# Patient Record
Sex: FEMALE | Race: WHITE | NOT HISPANIC OR LATINO | Employment: FULL TIME | URBAN - METROPOLITAN AREA
[De-identification: names, ages, dates, MRNs, and addresses within clinical notes are randomized per-mention and may not be internally consistent; named-entity substitution may affect disease eponyms.]

---

## 2017-09-12 ENCOUNTER — TRANSCRIBE ORDERS (OUTPATIENT)
Dept: ADMINISTRATIVE | Facility: HOSPITAL | Age: 37
End: 2017-09-12

## 2017-09-12 ENCOUNTER — HOSPITAL ENCOUNTER (OUTPATIENT)
Dept: GENERAL RADIOLOGY | Facility: HOSPITAL | Age: 37
Discharge: HOME | End: 2017-09-12
Admitting: NURSE PRACTITIONER

## 2017-09-12 DIAGNOSIS — M67.912 DISORDER OF LEFT ROTATOR CUFF: Primary | ICD-10-CM

## 2017-09-12 DIAGNOSIS — M67.912 DISORDER OF LEFT ROTATOR CUFF: ICD-10-CM

## 2017-09-12 PROCEDURE — 73030 X-RAY EXAM OF SHOULDER: CPT

## 2018-07-25 ENCOUNTER — TRANSCRIBE ORDERS (OUTPATIENT)
Dept: DIABETES SERVICES | Facility: HOSPITAL | Age: 38
End: 2018-07-25

## 2018-07-25 DIAGNOSIS — O24.419 GESTATIONAL DIABETES MELLITUS (GDM), ANTEPARTUM, GESTATIONAL DIABETES METHOD OF CONTROL UNSPECIFIED: Primary | ICD-10-CM

## 2018-07-26 ENCOUNTER — HOSPITAL ENCOUNTER (OUTPATIENT)
Dept: DIABETES SERVICES | Facility: HOSPITAL | Age: 38
Setting detail: RECURRING SERIES
Discharge: HOME | End: 2018-07-26

## 2018-07-26 PROCEDURE — G0109 DIAB MANAGE TRN IND/GROUP: HCPCS | Performed by: DIETITIAN, REGISTERED

## 2018-07-26 PROCEDURE — G0108 DIAB MANAGE TRN  PER INDIV: HCPCS | Performed by: DIETITIAN, REGISTERED

## 2018-08-07 ENCOUNTER — HOSPITAL ENCOUNTER (INPATIENT)
Facility: HOSPITAL | Age: 38
LOS: 5 days | Discharge: HOME | End: 2018-08-13
Attending: OBSTETRICS & GYNECOLOGY | Admitting: OBSTETRICS & GYNECOLOGY

## 2018-08-07 LAB
BACTERIA UR QL AUTO: NORMAL /HPF
BILIRUB UR QL STRIP: NEGATIVE
CLARITY UR: CLEAR
COLOR UR: YELLOW
DEPRECATED RDW RBC AUTO: 43.2 FL (ref 37–54)
ERYTHROCYTE [DISTWIDTH] IN BLOOD BY AUTOMATED COUNT: 12.9 % (ref 11.3–14.5)
GLUCOSE BLDC GLUCOMTR-MCNC: 85 MG/DL (ref 70–130)
GLUCOSE UR STRIP-MCNC: NEGATIVE MG/DL
HCT VFR BLD AUTO: 34.1 % (ref 34.5–44)
HGB BLD-MCNC: 11.3 G/DL (ref 11.5–15.5)
HGB UR QL STRIP.AUTO: ABNORMAL
HYALINE CASTS UR QL AUTO: NORMAL /LPF
KETONES UR QL STRIP: NEGATIVE
LEUKOCYTE ESTERASE UR QL STRIP.AUTO: NEGATIVE
MCH RBC QN AUTO: 30.5 PG (ref 27–31)
MCHC RBC AUTO-ENTMCNC: 33.1 G/DL (ref 32–36)
MCV RBC AUTO: 92.2 FL (ref 80–99)
NITRITE UR QL STRIP: NEGATIVE
PH UR STRIP.AUTO: 5.5 [PH] (ref 5–8)
PLATELET # BLD AUTO: 202 10*3/MM3 (ref 150–450)
PMV BLD AUTO: 10 FL (ref 6–12)
PROT UR QL STRIP: NEGATIVE
RBC # BLD AUTO: 3.7 10*6/MM3 (ref 3.89–5.14)
RBC # UR: NORMAL /HPF
REF LAB TEST METHOD: NORMAL
SP GR UR STRIP: 1.01 (ref 1–1.03)
SQUAMOUS #/AREA URNS HPF: NORMAL /HPF
UROBILINOGEN UR QL STRIP: ABNORMAL
WBC NRBC COR # BLD: 11.91 10*3/MM3 (ref 3.5–10.8)
WBC UR QL AUTO: NORMAL /HPF

## 2018-08-07 PROCEDURE — 86901 BLOOD TYPING SEROLOGIC RH(D): CPT | Performed by: OBSTETRICS & GYNECOLOGY

## 2018-08-07 PROCEDURE — 86850 RBC ANTIBODY SCREEN: CPT | Performed by: OBSTETRICS & GYNECOLOGY

## 2018-08-07 PROCEDURE — 86900 BLOOD TYPING SEROLOGIC ABO: CPT | Performed by: OBSTETRICS & GYNECOLOGY

## 2018-08-07 PROCEDURE — 85027 COMPLETE CBC AUTOMATED: CPT | Performed by: OBSTETRICS & GYNECOLOGY

## 2018-08-07 PROCEDURE — 82962 GLUCOSE BLOOD TEST: CPT

## 2018-08-07 PROCEDURE — 81001 URINALYSIS AUTO W/SCOPE: CPT | Performed by: OBSTETRICS & GYNECOLOGY

## 2018-08-08 ENCOUNTER — APPOINTMENT (OUTPATIENT)
Dept: WOMENS IMAGING | Facility: HOSPITAL | Age: 38
End: 2018-08-08

## 2018-08-08 PROBLEM — R58 BLEEDING: Status: ACTIVE | Noted: 2018-08-08

## 2018-08-08 PROBLEM — Z98.891 PREVIOUS CESAREAN SECTION: Status: ACTIVE | Noted: 2018-08-08

## 2018-08-08 PROBLEM — O44.03 PLACENTA PREVIA IN THIRD TRIMESTER: Status: ACTIVE | Noted: 2018-08-08

## 2018-08-08 LAB
ABO GROUP BLD: NORMAL
BLD GP AB SCN SERPL QL: NEGATIVE
DEPRECATED RDW RBC AUTO: 43.6 FL (ref 37–54)
ERYTHROCYTE [DISTWIDTH] IN BLOOD BY AUTOMATED COUNT: 13 % (ref 11.3–14.5)
GLUCOSE BLDC GLUCOMTR-MCNC: 190 MG/DL (ref 70–130)
GLUCOSE BLDC GLUCOMTR-MCNC: 95 MG/DL (ref 70–130)
HCT VFR BLD AUTO: 35.6 % (ref 34.5–44)
HGB BLD-MCNC: 11.6 G/DL (ref 11.5–15.5)
MCH RBC QN AUTO: 30.1 PG (ref 27–31)
MCHC RBC AUTO-ENTMCNC: 32.6 G/DL (ref 32–36)
MCV RBC AUTO: 92.5 FL (ref 80–99)
PLATELET # BLD AUTO: 224 10*3/MM3 (ref 150–450)
PMV BLD AUTO: 9.9 FL (ref 6–12)
RBC # BLD AUTO: 3.85 10*6/MM3 (ref 3.89–5.14)
RH BLD: POSITIVE
T&S EXPIRATION DATE: NORMAL
WBC NRBC COR # BLD: 8.98 10*3/MM3 (ref 3.5–10.8)

## 2018-08-08 PROCEDURE — 25010000002 BETAMETHASONE ACET & SOD PHOS PER 4 MG: Performed by: OBSTETRICS & GYNECOLOGY

## 2018-08-08 PROCEDURE — 85027 COMPLETE CBC AUTOMATED: CPT | Performed by: OBSTETRICS & GYNECOLOGY

## 2018-08-08 PROCEDURE — 76819 FETAL BIOPHYS PROFIL W/O NST: CPT

## 2018-08-08 PROCEDURE — 82962 GLUCOSE BLOOD TEST: CPT

## 2018-08-08 PROCEDURE — 76819 FETAL BIOPHYS PROFIL W/O NST: CPT | Performed by: OBSTETRICS & GYNECOLOGY

## 2018-08-08 PROCEDURE — 25010000002 MAGNESIUM SULFATE-LACT RINGERS 40 GM/580ML SOLUTION: Performed by: OBSTETRICS & GYNECOLOGY

## 2018-08-08 PROCEDURE — 76811 OB US DETAILED SNGL FETUS: CPT | Performed by: OBSTETRICS & GYNECOLOGY

## 2018-08-08 PROCEDURE — 76811 OB US DETAILED SNGL FETUS: CPT

## 2018-08-08 RX ORDER — AMOXICILLIN 250 MG/1
500 CAPSULE ORAL EVERY 8 HOURS
Status: DISCONTINUED | OUTPATIENT
Start: 2018-08-10 | End: 2018-08-10

## 2018-08-08 RX ORDER — MAGNESIUM SULF/RINGERS LACTATE 40 G/500ML
2 PLASTIC BAG, INJECTION (ML) INTRAVENOUS CONTINUOUS
Status: DISCONTINUED | OUTPATIENT
Start: 2018-08-08 | End: 2018-08-08

## 2018-08-08 RX ORDER — AZITHROMYCIN 250 MG/1
1000 TABLET, FILM COATED ORAL ONCE
Status: COMPLETED | OUTPATIENT
Start: 2018-08-08 | End: 2018-08-08

## 2018-08-08 RX ORDER — DEXTROSE AND SODIUM CHLORIDE 5; .2 G/100ML; G/100ML
96 INJECTION, SOLUTION INTRAVENOUS CONTINUOUS
Status: DISCONTINUED | OUTPATIENT
Start: 2018-08-08 | End: 2018-08-13 | Stop reason: HOSPADM

## 2018-08-08 RX ORDER — MAGNESIUM SULF/RINGERS LACTATE 40 G/500ML
2 PLASTIC BAG, INJECTION (ML) INTRAVENOUS CONTINUOUS
Status: DISCONTINUED | OUTPATIENT
Start: 2018-08-08 | End: 2018-08-11

## 2018-08-08 RX ORDER — SODIUM CHLORIDE 0.9 % (FLUSH) 0.9 %
1-10 SYRINGE (ML) INJECTION AS NEEDED
Status: DISCONTINUED | OUTPATIENT
Start: 2018-08-08 | End: 2018-08-13 | Stop reason: HOSPADM

## 2018-08-08 RX ORDER — BETAMETHASONE SODIUM PHOSPHATE AND BETAMETHASONE ACETATE 3; 3 MG/ML; MG/ML
12 INJECTION, SUSPENSION INTRA-ARTICULAR; INTRALESIONAL; INTRAMUSCULAR; SOFT TISSUE ONCE
Status: COMPLETED | OUTPATIENT
Start: 2018-08-08 | End: 2018-08-08

## 2018-08-08 RX ORDER — LIDOCAINE HYDROCHLORIDE 10 MG/ML
5 INJECTION, SOLUTION EPIDURAL; INFILTRATION; INTRACAUDAL; PERINEURAL AS NEEDED
Status: DISCONTINUED | OUTPATIENT
Start: 2018-08-08 | End: 2018-08-13 | Stop reason: HOSPADM

## 2018-08-08 RX ADMIN — AMPICILLIN SODIUM 2 G: 2 INJECTION, POWDER, FOR SOLUTION INTRAMUSCULAR; INTRAVENOUS at 11:27

## 2018-08-08 RX ADMIN — DEXTROSE AND SODIUM CHLORIDE 96 ML: 5; 200 INJECTION, SOLUTION INTRAVENOUS at 19:35

## 2018-08-08 RX ADMIN — AMPICILLIN SODIUM 2 G: 2 INJECTION, POWDER, FOR SOLUTION INTRAMUSCULAR; INTRAVENOUS at 17:52

## 2018-08-08 RX ADMIN — MAGNESIUM SULFATE HEPTAHYDRATE 2 G/HR: 500 INJECTION, SOLUTION INTRAMUSCULAR; INTRAVENOUS at 09:55

## 2018-08-08 RX ADMIN — AMPICILLIN SODIUM 2 G: 2 INJECTION, POWDER, FOR SOLUTION INTRAMUSCULAR; INTRAVENOUS at 23:14

## 2018-08-08 RX ADMIN — AZITHROMYCIN 1000 MG: 250 TABLET, FILM COATED ORAL at 10:39

## 2018-08-08 RX ADMIN — DEXTROSE AND SODIUM CHLORIDE 1000 ML: 5; 200 INJECTION, SOLUTION INTRAVENOUS at 10:31

## 2018-08-08 RX ADMIN — BETAMETHASONE SODIUM PHOSPHATE AND BETAMETHASONE ACETATE 12 MG: 3; 3 INJECTION, SUSPENSION INTRA-ARTICULAR; INTRALESIONAL; INTRAMUSCULAR at 09:54

## 2018-08-08 NOTE — PLAN OF CARE
Problem: Patient Care Overview  Goal: Plan of Care Review  Outcome: Ongoing (interventions implemented as appropriate)   08/08/18 0837   Coping/Psychosocial   Plan of Care Reviewed With patient   Plan of Care Review   Progress improving

## 2018-08-08 NOTE — PROGRESS NOTES
2018  HD:0  37 y.o. yo female  at 31w3d    Subjective   Hali denies new BRB or LOF.   Good FM.  PDC scan reveals placenta previa and oligohydramnios.  Reports good BS at home.       Objective   Temp: Temp:  [98.2 °F (36.8 °C)-99 °F (37.2 °C)] 98.2 °F (36.8 °C) Temp src: Oral   BP: BP: (112-127)/(67-70) 112/70        Pulse: Heart Rate:  [71-82] 71  RR: Resp:  [16-18] 16    General:  nad   Abdomen: Gravid, nontender   NST  Cat 1     Lab Results   Component Value Date    WBC 8.98 2018    HGB 11.6 2018    HCT 35.6 2018    MCV 92.5 2018     2018    HEPBSAG Negative 04/10/2016                     Assessment  1.   37 y.o. yo female  at 31w3d who presented with vaginal bleeding.  2.  Placenta previa and low fluid.    Plan  1.  Begin magnesium, steroids for FLM and antibiotics   2.  Repeat scan to evaluate fluid in 2-3 days.  3  Fetal growth and heart rate tracing reassuring.    This note has been electronically signed.    Petra Hernández MD  2018

## 2018-08-08 NOTE — H&P
[facility]  Obstetric History and Physical    Chief Complaint   Patient presents with   • Vaginal Bleeding   • Abdominal Pain       Subjective     Patient is a 37 y.o. female  currently at 31w2d, who presents with an episode of dark blood tinged mucous vaginal discharge.  She said she had the single episode that saturated a undergarment pad.  She got up and used the bathroom and again had some on the toilet paper, but far less.  She has not had any further discharge.   She denies recent intercourse or trauma.   She denies contractions.  She has some mild lower abdominal pressure, but this has been this way.  She has a history of a C/S due to breech 2 years ago.  She plans on another C/S.  She had a normal anatomy scan at 20 weeks.   +        The following portions of the patients history were reviewed and updated as appropriate: current medications, allergies, past medical history, past surgical history, past family history, past social history and problem list .       Prenatal Information:   Maternal Prenatal Labs  Blood Type No results found for: ABO   Rh Status No results found for: RH   Antibody Screen No results found for: ABSCRN   Gonnorhea No results found for: GCCX   Chlamydia No results found for: CLAMYDCU   RPR No results found for: RPR   Syphilis Antibody No results found for: SYPHILIS   Rubella No results found for: RUBELLAIGGIN   Hepatitis B Surface Antigen No results found for: HEPBSAG   HIV-1 Antibody No results found for: LABHIV1   Hepatitis C Antibody No results found for: HEPCAB   Rapid Urin Drug Screen No results found for: AMPMETHU, BARBITSCNUR, LABBENZSCN, LABMETHSCN, LABOPIASCN, THCURSCR, COCAINEUR, AMPHETSCREEN, PROPOXSCN, BUPRENORSCNU, METAMPSCNUR, OXYCODONESCN, TRICYCLICSCN   Group B Strep Culture No results found for: GBSANTIGEN           External Prenatal Results     Pregnancy Outside Results - Transcribed From Office Records - See Scanned Records For Details     Test Value Date  Time    Hgb 9.1 g/dL (L) 16 0708    Hct 27.6 % (L) 16 0708    ABO B  16 0925    Rh Positive  16 0925    Antibody Screen Negative  16 0925    Glucose Fasting GTT       Glucose Tolerance Test 1 hour 140  16     Glucose Tolerance Test 3 hour       Gonorrhea (discrete) Negative  16     Chlamydia (discrete) Negative  16     RPR Non-Reactive  04/10/16     VDRL       Syphilis Antibody       Rubella Immune  04/10/16     HBsAg Negative  04/10/16     Herpes Simplex Virus PCR       Herpes Simplex VIrus Culture       HIV       Hep C RNA Quant PCR       Hep C Antibody       AFP       Group B Strep Negative  16     GBS Susceptibility to Clindamycin       GBS Susceptibility to Erythromycin       Fetal Fibronectin       Genetic Testing, Maternal Blood             Drug Screening     Test Value Date Time    Urine Drug Screen Negative  16     Amphetamine Screen       Barbiturate Screen       Benzodiazepine Screen       Methadone Screen       Phencyclidine Screen       Opiates Screen       THC Screen       Cocaine Screen       Propoxyphene Screen       Buprenorphine Screen       Methamphetamine Screen       Oxycodone Screen       Tricyclic Antidepressants Screen                     Past OB History:     Obstetric History       T1      L1     SAB0   TAB0   Ectopic0   Molar0   Multiple0   Live Births1       # Outcome Date GA Lbr Ryland/2nd Weight Sex Delivery Anes PTL Lv   3 Current            2 Term 16 38w5d  3348 g (7 lb 6.1 oz) M CS-LTranv Spinal N MARY      Name: YULIAPROSPER      Apgar1:  8                Apgar5: 9   1 AB  5w0d                 Past Medical History: Past Medical History:   Diagnosis Date   • Anemia    • Gestational diabetes    • Wears glasses       Past Surgical History Past Surgical History:   Procedure Laterality Date   • INDUCED       EAB - CONFIDENTIAL   • TIBIA FRACTURE SURGERY Right 2000    rods in place   • WISDOM  TOOTH EXTRACTION        Family History: Family History   Problem Relation Age of Onset   • Cancer Father    • Liver cancer Father    • Heart attack Sister    • Heart attack Paternal Grandfather    • Cancer Paternal Grandmother    • Cancer Maternal Grandmother    • Diabetes Maternal Grandfather    • Cancer Maternal Grandfather       Social History:  reports that she quit smoking about 2 years ago. Her smoking use included Cigarettes. She has never used smokeless tobacco.   reports that she does not drink alcohol.   reports that she does not use drugs.        Review of Systems  Denies fever, HA, CP, Shortness of air, muscle weakness, and rashes      Objective     Vital Signs Range for the last 24 hours  Temperature: Temp:  [99 °F (37.2 °C)] 99 °F (37.2 °C)   Temp Source: Temp src: Oral   BP: BP: (120)/(68) 120/68   Pulse: Heart Rate:  [82] 82   Respirations: Resp:  [16] 16   SPO2:     O2 Amount (l/min):     O2 Devices     Weight: Weight:  [74.4 kg (164 lb)] 74.4 kg (164 lb)     Physical Examination: General appearance - alert, comfortable with no acute distress.    Chest - clear to auscultation, no wheezes, rales or rhonchi, symmetric air entry  Heart - S1 and S2 normal, no murmurs noted  Abdomen - soft, nontender, nondistended, no masses or organomegaly  no rebound tenderness noted  bowel sounds normal  No guarding, No RUQ pain  Extremities - pedal edema 1+    Presentation: Cephalic by USN   Cervix: Exam by: Method: sterile exam per physician   Dilation:  closed   Effacement: Cervical Effacement: 0%   Station:  high     Fetal Heart Rate Assessment   Method:     Beats/min:     Baseline:  130s   Varibility:  mod   Accels:  y   Decels:  n   Tracing Category:  1     Uterine Assessment   Method:     Frequency (min):  None   Ctx Count in 10 min:     Duration:     Intensity:     Intensity by IUPC:     Resting Tone:     Resting Tone by IUPC:           USN:    BPP 8/8   CR 11    Ferning:   Negative, pooling  negative          Assessment:  1. Intrauterine pregnancy at 31w2d weeks gestation with reassuring fetal status  2.  Third trimester spotting    Plan:  1. Admit for observation  2. PDC scan in the am.   3. Continuous monitoring on APU  4.  Discussed with Dr. Betty Leigh MD  8/7/2018  10:45 PM

## 2018-08-08 NOTE — PLAN OF CARE
Problem:  Labor (Adult,Obstetrics,Pediatric)  Goal: Signs and Symptoms of Listed Potential Problems Will be Absent, Minimized or Managed ( Labor)  Outcome: Ongoing (interventions implemented as appropriate)   18 0838   Goal/Outcome Evaluation   Problems Assessed ( Labor) all   Problems Present ( Labor) none

## 2018-08-09 LAB
GLUCOSE BLDC GLUCOMTR-MCNC: 119 MG/DL (ref 70–130)
GLUCOSE BLDC GLUCOMTR-MCNC: 153 MG/DL (ref 70–130)
GLUCOSE BLDC GLUCOMTR-MCNC: 162 MG/DL (ref 70–130)
GLUCOSE BLDC GLUCOMTR-MCNC: 169 MG/DL (ref 70–130)

## 2018-08-09 PROCEDURE — 25010000002 MAGNESIUM SULFATE-LACT RINGERS 40 GM/580ML SOLUTION: Performed by: OBSTETRICS & GYNECOLOGY

## 2018-08-09 PROCEDURE — 59025 FETAL NON-STRESS TEST: CPT

## 2018-08-09 PROCEDURE — 82962 GLUCOSE BLOOD TEST: CPT

## 2018-08-09 PROCEDURE — 25010000002 BETAMETHASONE ACET & SOD PHOS PER 4 MG: Performed by: OBSTETRICS & GYNECOLOGY

## 2018-08-09 RX ORDER — BETAMETHASONE SODIUM PHOSPHATE AND BETAMETHASONE ACETATE 3; 3 MG/ML; MG/ML
12 INJECTION, SUSPENSION INTRA-ARTICULAR; INTRALESIONAL; INTRAMUSCULAR; SOFT TISSUE ONCE
Status: COMPLETED | OUTPATIENT
Start: 2018-08-09 | End: 2018-08-09

## 2018-08-09 RX ORDER — BETAMETHASONE SODIUM PHOSPHATE AND BETAMETHASONE ACETATE 3; 3 MG/ML; MG/ML
12 INJECTION, SUSPENSION INTRA-ARTICULAR; INTRALESIONAL; INTRAMUSCULAR; SOFT TISSUE EVERY 24 HOURS
Status: DISCONTINUED | OUTPATIENT
Start: 2018-08-09 | End: 2018-08-09

## 2018-08-09 RX ORDER — ACETAMINOPHEN 500 MG
1000 TABLET ORAL EVERY 6 HOURS PRN
Status: DISCONTINUED | OUTPATIENT
Start: 2018-08-09 | End: 2018-08-13 | Stop reason: HOSPADM

## 2018-08-09 RX ADMIN — AMPICILLIN SODIUM 2 G: 2 INJECTION, POWDER, FOR SOLUTION INTRAMUSCULAR; INTRAVENOUS at 10:24

## 2018-08-09 RX ADMIN — MAGNESIUM SULFATE HEPTAHYDRATE 2 G/HR: 500 INJECTION, SOLUTION INTRAMUSCULAR; INTRAVENOUS at 01:15

## 2018-08-09 RX ADMIN — BETAMETHASONE SODIUM PHOSPHATE AND BETAMETHASONE ACETATE 12 MG: 3; 3 INJECTION, SUSPENSION INTRA-ARTICULAR; INTRALESIONAL; INTRAMUSCULAR at 10:15

## 2018-08-09 RX ADMIN — AMPICILLIN SODIUM 2 G: 2 INJECTION, POWDER, FOR SOLUTION INTRAMUSCULAR; INTRAVENOUS at 16:36

## 2018-08-09 RX ADMIN — ACETAMINOPHEN 1000 MG: 500 TABLET, FILM COATED ORAL at 02:51

## 2018-08-09 RX ADMIN — DEXTROSE AND SODIUM CHLORIDE 96 ML: 5; 200 INJECTION, SOLUTION INTRAVENOUS at 05:23

## 2018-08-09 RX ADMIN — AMPICILLIN SODIUM 2 G: 2 INJECTION, POWDER, FOR SOLUTION INTRAMUSCULAR; INTRAVENOUS at 22:57

## 2018-08-09 RX ADMIN — DEXTROSE AND SODIUM CHLORIDE 96 ML: 5; 200 INJECTION, SOLUTION INTRAVENOUS at 16:22

## 2018-08-09 RX ADMIN — MAGNESIUM SULFATE HEPTAHYDRATE 2 G/HR: 500 INJECTION, SOLUTION INTRAMUSCULAR; INTRAVENOUS at 22:59

## 2018-08-09 RX ADMIN — AMPICILLIN SODIUM 2 G: 2 INJECTION, POWDER, FOR SOLUTION INTRAMUSCULAR; INTRAVENOUS at 05:22

## 2018-08-09 NOTE — PROGRESS NOTES
"Adult Nutrition  Assessment/PES    Patient Name:  Hali Dennis  YOB: 1980  MRN: [id number]  Admit Date:  8/7/2018    Assessment Date:  8/9/2018    Comments:            Reason for Assessment     Row Name 08/09/18 1301          Reason for Assessment    Reason For Assessment identified at risk by screening criteria     Diagnosis --   admitted at 31w2d with placenta previs, oligohydramnios and GDM             Nutrition/Diet History     Row Name 08/09/18 1302          Nutrition/Diet History    Typical Food/Fluid Intake Spoke w/patient, she stated that her appetite is good and she does not have any c/o nausea today.  She stated her pre-pregnancy weight was 135 pounds.  Patient has GDM and stated that she is diet controlled and her blood sugars have been within normal limits but since receiving steroids they have been elevated.  She stated that she sticks to 30 grams of carbohydrates at breastfast and 45-55 at lunch and dinner.  Food has been good and within the number of carbohydrates she takes.             Anthropometrics     Row Name 08/09/18 1306          Anthropometrics    Height Method measured     Height 160 cm (62.99\")     Current Weight Method measured     Weight 74.4 kg (164 lb 0.4 oz)        Ideal Body Weight (IBW)    Ideal Body Weight (IBW) (kg) 52.7     % Ideal Body Weight 141.18        Usual Body Weight (UBW)    Usual Body Weight 61.2 kg (135 lb)   pre-pregnancy weight     % Usual Body Weight 121.5     Weight Loss intentional        Body Mass Index (BMI)    BMI (kg/m2) 29.12          Labs/Tests/Procedures/Meds     Row Name 08/09/18 1308          Labs/Procedures/Meds    Lab Results Reviewed reviewed               Estimated/Assessed Needs     Row Name 08/09/18 1306          Calculation Measurements    Height 160 cm (62.99\")             Nutrition Prescription Ordered     Row Name 08/09/18 1308          Nutrition Prescription PO    Current PO Diet Regular     Common Modifiers Consistent " "Carbohydrate             Evaluation of Received Nutrient/Fluid Intake     Row Name 08/09/18 1308 08/09/18 1306       Calculation Measurements    Height  -- 160 cm (62.99\")       PO Evaluation    Number of Days PO Intake Evaluated Insufficient Data  --            Evaluation of Prescribed Nutrient/Fluid Intake     Row Name 08/09/18 1306          Calculation Measurements    Height 160 cm (62.99\")           Problem/Interventions:        Problem 1     Row Name 08/09/18 1308          Nutrition Diagnoses Problem 1    Problem 1 Impaired Nutrient Utilization     Etiology (related to) Medical Diagnosis     Endocrine GDM     Signs/Symptoms (evidenced by) Biochemical     Labs Reviewed Done     Specific Labs Noted Glucose                     Intervention Goal     Row Name 08/09/18 1309          Intervention Goal    General Nutrition support treatment     Weight Appropriate weight gain             Nutrition Intervention     Row Name 08/09/18 1309          Nutrition Intervention    RD/Tech Action Interview for preference;Encourage intake;Follow Tx progress;Care plan reviewd               Education/Evaluation     Row Name 08/09/18 1309          Monitor/Evaluation    Monitor Per protocol;PO intake;Pertinent labs;Weight         Electronically signed by:  Darling Coe RD  08/09/18 1:11 PM   Time Spent:  30 minutes  "

## 2018-08-09 NOTE — PLAN OF CARE
Problem: Patient Care Overview  Goal: Plan of Care Review  Outcome: Ongoing (interventions implemented as appropriate)   18 0626   Coping/Psychosocial   Plan of Care Reviewed With patient   Plan of Care Review   Progress no change       Problem:  Labor (Adult,Obstetrics,Pediatric)  Goal: Signs and Symptoms of Listed Potential Problems Will be Absent, Minimized or Managed ( Labor)  Outcome: Ongoing (interventions implemented as appropriate)   18 0626   Goal/Outcome Evaluation   Problems Assessed ( Labor) all   Problems Present ( Labor) none

## 2018-08-09 NOTE — PLAN OF CARE
Problem: Patient Care Overview  Goal: Plan of Care Review  Outcome: Ongoing (interventions implemented as appropriate)   18 1500   Coping/Psychosocial   Plan of Care Reviewed With patient   Plan of Care Review   Progress no change       Problem:  Labor (Adult,Obstetrics,Pediatric)  Intervention: Prevent/Manage Maternal Infection   18 1500   Hygiene Care   Perineal Care absorbent pad changed   Safety Interventions   Infection Management aseptic technique maintained       Goal: Signs and Symptoms of Listed Potential Problems Will be Absent, Minimized or Managed ( Labor)  Outcome: Ongoing (interventions implemented as appropriate)   18 1500   Goal/Outcome Evaluation   Problems Assessed ( Labor) all   Problems Present ( Labor) none

## 2018-08-09 NOTE — PROGRESS NOTES
2018  HD:1  37 y.o. yo female  at 31w4d    Subjective   Hali denies new bleeding or LOF.   Tolerating Magnesium       Objective   Temp: Temp:  [98 °F (36.7 °C)-98.6 °F (37 °C)] 98 °F (36.7 °C) Temp src: Oral   BP: BP: ()/(50-77) 126/73        Pulse: Heart Rate:  [69-93] 84  RR: Resp:  [14-18] 14    General:  nad   Abdomen: Gravid, nontender   NST Rx     Lab Results   Component Value Date    WBC 8.98 2018    HGB 11.6 2018    HCT 35.6 2018    MCV 92.5 2018     2018    HEPBSAG Negative 04/10/2016                     Assessment  1.   37 y.o. yo female  at 31w4d  2.   Placenta previa with vaginal bleeding on presentation  3.  Oligohydramnios    Plan  1. Cont current hospital care.   2.   Repeat PDC scan in am.    3.  Continue magnesium, steroids and antibiotics.      This note has been electronically signed.    Petra Hernández MD  2018

## 2018-08-10 ENCOUNTER — APPOINTMENT (OUTPATIENT)
Dept: WOMENS IMAGING | Facility: HOSPITAL | Age: 38
End: 2018-08-10

## 2018-08-10 LAB
GLUCOSE BLDC GLUCOMTR-MCNC: 111 MG/DL (ref 70–130)
GLUCOSE BLDC GLUCOMTR-MCNC: 114 MG/DL (ref 70–130)
GLUCOSE BLDC GLUCOMTR-MCNC: 127 MG/DL (ref 70–130)
GLUCOSE BLDC GLUCOMTR-MCNC: 130 MG/DL (ref 70–130)

## 2018-08-10 PROCEDURE — 59025 FETAL NON-STRESS TEST: CPT

## 2018-08-10 PROCEDURE — 25010000002 MAGNESIUM SULFATE-LACT RINGERS 40 GM/580ML SOLUTION: Performed by: OBSTETRICS & GYNECOLOGY

## 2018-08-10 PROCEDURE — 76819 FETAL BIOPHYS PROFIL W/O NST: CPT

## 2018-08-10 PROCEDURE — 76817 TRANSVAGINAL US OBSTETRIC: CPT | Performed by: OBSTETRICS & GYNECOLOGY

## 2018-08-10 PROCEDURE — 76817 TRANSVAGINAL US OBSTETRIC: CPT

## 2018-08-10 PROCEDURE — 82962 GLUCOSE BLOOD TEST: CPT

## 2018-08-10 RX ORDER — DOCUSATE SODIUM 100 MG/1
100 CAPSULE, LIQUID FILLED ORAL 2 TIMES DAILY
Status: DISCONTINUED | OUTPATIENT
Start: 2018-08-10 | End: 2018-08-13 | Stop reason: HOSPADM

## 2018-08-10 RX ADMIN — MAGNESIUM SULFATE HEPTAHYDRATE 2 G/HR: 500 INJECTION, SOLUTION INTRAMUSCULAR; INTRAVENOUS at 20:53

## 2018-08-10 RX ADMIN — AMPICILLIN SODIUM 2 G: 2 INJECTION, POWDER, FOR SOLUTION INTRAMUSCULAR; INTRAVENOUS at 05:57

## 2018-08-10 RX ADMIN — ACETAMINOPHEN 1000 MG: 500 TABLET, FILM COATED ORAL at 01:47

## 2018-08-10 RX ADMIN — DOCUSATE SODIUM 100 MG: 100 CAPSULE, LIQUID FILLED ORAL at 12:43

## 2018-08-11 LAB
GLUCOSE BLDC GLUCOMTR-MCNC: 106 MG/DL (ref 70–130)
GLUCOSE BLDC GLUCOMTR-MCNC: 91 MG/DL (ref 70–130)
GLUCOSE BLDC GLUCOMTR-MCNC: 93 MG/DL (ref 70–130)

## 2018-08-11 PROCEDURE — 82962 GLUCOSE BLOOD TEST: CPT

## 2018-08-11 PROCEDURE — 59025 FETAL NON-STRESS TEST: CPT

## 2018-08-11 RX ORDER — FAMOTIDINE 20 MG/1
20 TABLET, FILM COATED ORAL 2 TIMES DAILY
Status: DISCONTINUED | OUTPATIENT
Start: 2018-08-11 | End: 2018-08-13 | Stop reason: HOSPADM

## 2018-08-11 RX ORDER — PRENATAL VIT/IRON FUM/FOLIC AC 27MG-0.8MG
1 TABLET ORAL DAILY
Status: DISCONTINUED | OUTPATIENT
Start: 2018-08-11 | End: 2018-08-13 | Stop reason: HOSPADM

## 2018-08-11 RX ORDER — SODIUM CHLORIDE, SODIUM LACTATE, POTASSIUM CHLORIDE, CALCIUM CHLORIDE 600; 310; 30; 20 MG/100ML; MG/100ML; MG/100ML; MG/100ML
125 INJECTION, SOLUTION INTRAVENOUS CONTINUOUS
Status: DISCONTINUED | OUTPATIENT
Start: 2018-08-11 | End: 2018-08-13 | Stop reason: HOSPADM

## 2018-08-11 RX ORDER — FAMOTIDINE 20 MG/1
20 TABLET, FILM COATED ORAL 2 TIMES DAILY
Status: DISCONTINUED | OUTPATIENT
Start: 2018-08-11 | End: 2018-08-11

## 2018-08-11 RX ORDER — TRISODIUM CITRATE DIHYDRATE AND CITRIC ACID MONOHYDRATE 500; 334 MG/5ML; MG/5ML
30 SOLUTION ORAL ONCE
Status: COMPLETED | OUTPATIENT
Start: 2018-08-11 | End: 2018-08-11

## 2018-08-11 RX ORDER — NIFEDIPINE 10 MG/1
10 CAPSULE ORAL
Status: DISCONTINUED | OUTPATIENT
Start: 2018-08-11 | End: 2018-08-13 | Stop reason: HOSPADM

## 2018-08-11 RX ADMIN — DEXTROSE AND SODIUM CHLORIDE 1000 ML: 5; 200 INJECTION, SOLUTION INTRAVENOUS at 00:17

## 2018-08-11 RX ADMIN — DOCUSATE SODIUM 100 MG: 100 CAPSULE, LIQUID FILLED ORAL at 08:55

## 2018-08-11 RX ADMIN — FAMOTIDINE 20 MG: 20 TABLET ORAL at 18:43

## 2018-08-11 RX ADMIN — NIFEDIPINE 10 MG: 10 CAPSULE, LIQUID FILLED ORAL at 20:06

## 2018-08-11 RX ADMIN — SODIUM CHLORIDE, POTASSIUM CHLORIDE, SODIUM LACTATE AND CALCIUM CHLORIDE 125 ML/HR: 600; 310; 30; 20 INJECTION, SOLUTION INTRAVENOUS at 10:17

## 2018-08-11 RX ADMIN — NIFEDIPINE 10 MG: 10 CAPSULE, LIQUID FILLED ORAL at 17:22

## 2018-08-11 RX ADMIN — DOCUSATE SODIUM 100 MG: 100 CAPSULE, LIQUID FILLED ORAL at 20:06

## 2018-08-11 RX ADMIN — SODIUM CITRATE AND CITRIC ACID MONOHYDRATE 30 ML: 500; 334 SOLUTION ORAL at 17:54

## 2018-08-11 RX ADMIN — NIFEDIPINE 10 MG: 10 CAPSULE, LIQUID FILLED ORAL at 13:50

## 2018-08-11 NOTE — PROGRESS NOTES
8/10/2018  HD:2  37 y.o. yo female  at 31w5d    Subjective   Hali denies new bleeding or LOF.         Objective   Temp: Temp:  [98 °F (36.7 °C)-99 °F (37.2 °C)] 99 °F (37.2 °C) Temp src: Oral   BP: BP: ()/(51-76) 108/51        Pulse: Heart Rate:  [62-80] 67  RR: Resp:  [14-18] 16    General:  nad   Abdomen: Gravid, nontender         Lab Results   Component Value Date    WBC 8.98 2018    HGB 11.6 2018    HCT 35.6 2018    MCV 92.5 2018     2018    HEPBSAG Negative 04/10/2016                     Assessment  1.   37 y.o. yo female  at 31w5d  2.   Placenta previa, Borderline oligohydramnios    Plan  1. Cont current hospital care.   2.  D/C mag in am  3.  Repeat fluid on Monday with PDC.    This note has been electronically signed.    Petra Hernández MD  August 10, 2018

## 2018-08-11 NOTE — PLAN OF CARE
Problem: Patient Care Overview  Goal: Plan of Care Review  Outcome: Ongoing (interventions implemented as appropriate)   18   Coping/Psychosocial   Plan of Care Reviewed With patient   Plan of Care Review   Progress no change     Goal: Individualization and Mutuality  Outcome: Ongoing (interventions implemented as appropriate)    Goal: Discharge Needs Assessment  Outcome: Ongoing (interventions implemented as appropriate)    Goal: Interprofessional Rounds/Family Conf  Outcome: Ongoing (interventions implemented as appropriate)   18   Interdisciplinary Rounds/Family Conf   Participants patient;pharmacy;physician;nursing       Problem:  Labor (Adult,Obstetrics,Pediatric)  Goal: Signs and Symptoms of Listed Potential Problems Will be Absent, Minimized or Managed ( Labor)  Outcome: Ongoing (interventions implemented as appropriate)   18   Goal/Outcome Evaluation   Problems Assessed ( Labor) all   Problems Present ( Labor) none

## 2018-08-11 NOTE — PROGRESS NOTES
2018  HD:3  37 y.o. yo female  at 31w6d    Subjective   Hali is now off the Magesium and denies bleeding or LOF.       Objective   Temp: Temp:  [98 °F (36.7 °C)-99 °F (37.2 °C)] 98.2 °F (36.8 °C) Temp src: Oral   BP: BP: ()/(50-76) 115/71        Pulse: Heart Rate:  [60-83] 64  RR: Resp:  [14-18] 16    General:  nad   Abdomen: Gravid, nontender   NST Rx     Lab Results   Component Value Date    WBC 8.98 2018    HGB 11.6 2018    HCT 35.6 2018    MCV 92.5 2018     2018    HEPBSAG Negative 04/10/2016                     Assessment  1.   37 y.o. yo female  at 31w6d  2.   Placenta previa  3.  Breech  4.  Funic presentation  5.  Marginal previa      Plan  1. Cont current hospital care starting nifedipine to prevent contractions  2. S/p steroids for FLM  3.    Repeat PDC scan in am.    This note has been electronically signed.    Petra Hernández MD  2018

## 2018-08-12 LAB
GLUCOSE BLDC GLUCOMTR-MCNC: 117 MG/DL (ref 70–130)
GLUCOSE BLDC GLUCOMTR-MCNC: 75 MG/DL (ref 70–130)
GLUCOSE BLDC GLUCOMTR-MCNC: 85 MG/DL (ref 70–130)
GLUCOSE BLDC GLUCOMTR-MCNC: 93 MG/DL (ref 70–130)

## 2018-08-12 PROCEDURE — 59025 FETAL NON-STRESS TEST: CPT

## 2018-08-12 PROCEDURE — 82962 GLUCOSE BLOOD TEST: CPT

## 2018-08-12 RX ADMIN — NIFEDIPINE 10 MG: 10 CAPSULE, LIQUID FILLED ORAL at 00:01

## 2018-08-12 RX ADMIN — FAMOTIDINE 20 MG: 20 TABLET ORAL at 09:02

## 2018-08-12 RX ADMIN — NIFEDIPINE 10 MG: 10 CAPSULE, LIQUID FILLED ORAL at 04:12

## 2018-08-12 RX ADMIN — NIFEDIPINE 10 MG: 10 CAPSULE, LIQUID FILLED ORAL at 20:30

## 2018-08-12 RX ADMIN — NIFEDIPINE 10 MG: 10 CAPSULE, LIQUID FILLED ORAL at 12:11

## 2018-08-12 RX ADMIN — NIFEDIPINE 10 MG: 10 CAPSULE, LIQUID FILLED ORAL at 16:24

## 2018-08-12 RX ADMIN — DOCUSATE SODIUM 100 MG: 100 CAPSULE, LIQUID FILLED ORAL at 08:53

## 2018-08-12 RX ADMIN — NIFEDIPINE 10 MG: 10 CAPSULE, LIQUID FILLED ORAL at 08:53

## 2018-08-12 RX ADMIN — DOCUSATE SODIUM 100 MG: 100 CAPSULE, LIQUID FILLED ORAL at 20:30

## 2018-08-12 RX ADMIN — FAMOTIDINE 20 MG: 20 TABLET ORAL at 20:30

## 2018-08-12 NOTE — PLAN OF CARE
Problem: Patient Care Overview  Goal: Plan of Care Review   18   Coping/Psychosocial   Plan of Care Reviewed With patient;family --    Plan of Care Review   Progress --  no change      18   Coping/Psychosocial   Plan of Care Reviewed With patient;family --    Plan of Care Review   Progress --  no change     Goal: Individualization and Mutuality  Outcome: Ongoing (interventions implemented as appropriate)   18   Individualization   Patient Specific Preferences Maintain pregnancy    Patient Specific Goals (Include Timeframe) healthy mom, healthy baby   Patient Specific Interventions none     Goal: Discharge Needs Assessment  Outcome: Ongoing (interventions implemented as appropriate)   18   Discharge Needs Assessment   Concerns to be Addressed denies needs/concerns at this time   Patient/Family Anticipates Transition to home with family   Patient/Family Anticipated Services at Transition none   Transportation Concerns car, none   Transportation Anticipated family or friend will provide   Anticipated Changes Related to Illness none   Equipment Needed After Discharge none   Disability   Equipment Currently Used at Home none       Problem:  Labor (Adult,Obstetrics,Pediatric)  Intervention: Monitor Maternal/Fetal Wellbeing   18   Reproductive Interventions   Fetal Wellbeing Promotion fetal heart rate monitored;maternal position adjusted;uterine contraction activity assessed     Intervention: Optimize/Support Psychosocial Response to  Labor   18   Coping/Psychosocial Interventions   Supportive Measures --  active listening utilized;decision-making supported;positive reinforcement provided;verbalization of feelings encouraged   Interventions   Trust Relationship/Rapport care explained;choices provided;emotional support provided;empathic listening provided;questions answered;questions  encouraged;reassurance provided;thoughts/feelings acknowledged --    Psychosocial Support   Diversional Activities television --    Family/Support System Care involvement promoted;presence promoted;support provided --      Intervention: Manage Tocolytic Therapy Administration   18   Safety Management   Medication Review/Management medications reviewed     Intervention: Encourage Sidelying Position   18   Positioning   Body Position sitting up in bed       Goal: Signs and Symptoms of Listed Potential Problems Will be Absent, Minimized or Managed ( Labor)  Outcome: Ongoing (interventions implemented as appropriate)   18   Goal/Outcome Evaluation   Problems Assessed ( Labor) all   Problems Present ( Labor) none      18   Goal/Outcome Evaluation   Problems Assessed ( Labor) all   Problems Present ( Labor) none       Problem: Diabetes in Pregnancy (Adult,Obstetrics,Pediatric)  Intervention: Promote/Monitor Maternal/Fetal Wellbeing   18   Reproductive Interventions   Fetal Wellbeing Promotion --  fetal heart rate monitored;maternal position adjusted;uterine contraction activity assessed   Positioning   Body Position sitting up in bed --      Intervention: Support Psychosocial Response to Complicated Pregnancy   18   Coping/Psychosocial Interventions   Supportive Measures --  active listening utilized;decision-making supported;positive reinforcement provided;verbalization of feelings encouraged   Psychosocial Support   Family/Support System Care involvement promoted;presence promoted;support provided --      Intervention: Optimize Glycemic Control   18   Nutrition Interventions   Glycemic Management blood glucose monitoring       Goal: Signs and Symptoms of Listed Potential Problems Will be Absent, Minimized or Managed (Diabetes in Pregnancy)  Outcome: Ongoing (interventions  implemented as appropriate)   08/12/18 4808   Goal/Outcome Evaluation   Problems Assessed (Diabetes in Pregnancy) all   Problems Present (Diabetes with Pregnancy) none

## 2018-08-12 NOTE — PROGRESS NOTES
2018  HD:4  37 y.o. yo female  at 32w0d    Subjective   Hali has no new complaints       Objective   Temp: Temp:  [97.6 °F (36.4 °C)-99.1 °F (37.3 °C)] 97.6 °F (36.4 °C) Temp src: Axillary   BP: BP: (103-134)/(54-76) 123/73        Pulse: Heart Rate:  [63-91] 91  RR: Resp:  [16] 16    General:  nad   Abdomen: Gravid, nontender         Lab Results   Component Value Date    WBC 8.98 2018    HGB 11.6 2018    HCT 35.6 2018    MCV 92.5 2018     2018    HEPBSAG Negative 04/10/2016                     Assessment  1.   37 y.o. yo female  at 32w0d  2.   Breech with funic presentation.  3.   CR 7.7    Plan  1. Cont current hospital care.   2.   Repeat PDC scan in am  3.  Continue Nifedipine.    This note has been electronically signed.    Petra Hernández MD  2018

## 2018-08-13 ENCOUNTER — APPOINTMENT (OUTPATIENT)
Dept: WOMENS IMAGING | Facility: HOSPITAL | Age: 38
End: 2018-08-13

## 2018-08-13 VITALS
RESPIRATION RATE: 16 BRPM | SYSTOLIC BLOOD PRESSURE: 126 MMHG | BODY MASS INDEX: 29.06 KG/M2 | TEMPERATURE: 98.5 F | WEIGHT: 164.02 LBS | HEIGHT: 63 IN | DIASTOLIC BLOOD PRESSURE: 71 MMHG | HEART RATE: 80 BPM

## 2018-08-13 PROBLEM — R58 BLEEDING: Status: RESOLVED | Noted: 2018-08-08 | Resolved: 2018-08-13

## 2018-08-13 LAB — GLUCOSE BLDC GLUCOMTR-MCNC: 96 MG/DL (ref 70–130)

## 2018-08-13 PROCEDURE — 82962 GLUCOSE BLOOD TEST: CPT

## 2018-08-13 PROCEDURE — 76819 FETAL BIOPHYS PROFIL W/O NST: CPT | Performed by: OBSTETRICS & GYNECOLOGY

## 2018-08-13 PROCEDURE — 76819 FETAL BIOPHYS PROFIL W/O NST: CPT

## 2018-08-13 RX ORDER — NIFEDIPINE 10 MG/1
10 CAPSULE ORAL
Qty: 30 CAPSULE | Refills: 2 | Status: SHIPPED | OUTPATIENT
Start: 2018-08-13 | End: 2018-09-09 | Stop reason: HOSPADM

## 2018-08-13 RX ORDER — PSEUDOEPHEDRINE HCL 30 MG
100 TABLET ORAL 2 TIMES DAILY
Qty: 60 CAPSULE | Refills: 3 | Status: SHIPPED | OUTPATIENT
Start: 2018-08-13 | End: 2018-11-01

## 2018-08-13 RX ADMIN — FAMOTIDINE 20 MG: 20 TABLET ORAL at 08:16

## 2018-08-13 RX ADMIN — NIFEDIPINE 10 MG: 10 CAPSULE, LIQUID FILLED ORAL at 04:11

## 2018-08-13 RX ADMIN — NIFEDIPINE 10 MG: 10 CAPSULE, LIQUID FILLED ORAL at 00:15

## 2018-08-13 RX ADMIN — NIFEDIPINE 10 MG: 10 CAPSULE, LIQUID FILLED ORAL at 08:16

## 2018-08-13 RX ADMIN — DOCUSATE SODIUM 100 MG: 100 CAPSULE, LIQUID FILLED ORAL at 08:16

## 2018-08-13 RX ADMIN — PRENATAL VIT W/ FE FUMARATE-FA TAB 27-0.8 MG 1 TABLET: 27-0.8 TAB at 08:16

## 2018-08-13 NOTE — NURSING NOTE
Patient discharged home undelivered. Informed patient to keep follow up appointment or induction date. Informed patient to return to hospital or clinic with contractions that get stronger or closer together, decreased fetal movement, leaking of fluid, or vaginal bleeding. Patient verbalized understanding. Patient ambulated to private vehicle accompanied by nursing assistant.

## 2018-08-13 NOTE — DISCHARGE SUMMARY
Date of Discharge:  2018    Discharge Diagnosis: Praevia    Presenting Problem/History of Present Illness  Bleeding [R58]       Hospital Course  Patient is a 37 y.o. female presented with bleeding that has now resolved.      Procedures Performed  Procedure(s):   SECTION REPEAT * 39 WKS *       Consults:   Consults     No orders found from 2018 to 2018.          Pertinent Test Results:     Condition on Discharge:  good    Vital Signs  Temp:  [97.6 °F (36.4 °C)-98.8 °F (37.1 °C)] 98.5 °F (36.9 °C)  Heart Rate:  [74-91] 80  Resp:  [16-18] 16  BP: (111-131)/(61-78) 126/71    Physical Exam:     General Appearance:    Alert, cooperative, in no acute distress   Head:    Normocephalic, without obvious abnormality, atraumatic   Eyes:            Lids and lashes normal, conjunctivae and sclerae normal, no   icterus, no pallor, corneas clear, PERRLA   Ears:    Ears appear intact with no abnormalities noted   Throat:   No oral lesions, no thrush, oral mucosa moist   Neck:   No adenopathy, supple, trachea midline, no thyromegaly, no     carotid bruit, no JVD   Back:     No kyphosis present, no scoliosis present, no skin lesions,       erythema or scars, no tenderness to percussion or                   palpation,   range of motion normal   Lungs:     Clear to auscultation,respirations regular, even and                   unlabored    Heart:    Regular rhythm and normal rate, normal S1 and S2, no            murmur, no gallop, no rub, no click   Breast Exam:    Deferred   Abdomen:     Normal bowel sounds, no masses, no organomegaly, soft        non-tender, non-distended, no guarding, no rebound                 tenderness   Genitalia:    Deferred   Extremities:   Moves all extremities well, no edema, no cyanosis, no              redness   Pulses:   Pulses palpable and equal bilaterally   Skin:   No bleeding, bruising or rash   Lymph nodes:   No palpable adenopathy   Neurologic:   Cranial nerves 2 - 12 grossly  intact, sensation intact, DTR        present and equal bilaterally       Discharge Disposition  Home or Self Care    Discharge Medications     Discharge Medications      New Medications      Instructions Start Date   docusate sodium 100 MG capsule   100 mg, Oral, 2 Times Daily      NIFEdipine 10 MG capsule  Commonly known as:  PROCARDIA   10 mg, Oral, Every 4 Hours Scheduled         Continue These Medications      Instructions Start Date   ferrous sulfate 325 (65 FE) MG tablet   325 mg, Oral, Daily With Breakfast      Prenatal 27-1 27-1 MG tablet tablet   1 tablet, Oral, Daily         Stop These Medications    oxyCODONE-acetaminophen 5-325 MG per tablet  Commonly known as:  PERCOCET            Discharge Diet:     Activity at Discharge:     Follow-up Appointments  Future Appointments  Date Time Provider Department Center   9/30/2018 2:00 PM [facility]         Test Results Pending at Discharge       Lauren Jane MD  08/13/18  9:10 AM    Time: Discharge 10 min

## 2018-09-04 ENCOUNTER — APPOINTMENT (OUTPATIENT)
Dept: WOMENS IMAGING | Facility: HOSPITAL | Age: 38
End: 2018-09-04

## 2018-09-04 ENCOUNTER — HOSPITAL ENCOUNTER (INPATIENT)
Facility: HOSPITAL | Age: 38
LOS: 5 days | Discharge: HOME | End: 2018-09-09
Attending: OBSTETRICS & GYNECOLOGY | Admitting: OBSTETRICS & GYNECOLOGY

## 2018-09-04 DIAGNOSIS — O24.410 DIET CONTROLLED GESTATIONAL DIABETES MELLITUS (GDM), ANTEPARTUM: ICD-10-CM

## 2018-09-04 DIAGNOSIS — O41.00X0 OLIGOHYDRAMNIOS, ANTEPARTUM, SINGLE OR UNSPECIFIED FETUS: ICD-10-CM

## 2018-09-04 LAB
ABO GROUP BLD: NORMAL
ALP SERPL-CCNC: 74 U/L (ref 25–100)
ALT SERPL W P-5'-P-CCNC: 15 U/L (ref 7–40)
AST SERPL-CCNC: 19 U/L (ref 0–33)
BILIRUB SERPL-MCNC: 0.4 MG/DL (ref 0.3–1.2)
BLD GP AB SCN SERPL QL: NEGATIVE
CREAT BLD-MCNC: 0.4 MG/DL (ref 0.6–1.3)
DEPRECATED RDW RBC AUTO: 44.7 FL (ref 37–54)
ERYTHROCYTE [DISTWIDTH] IN BLOOD BY AUTOMATED COUNT: 13.4 % (ref 11.3–14.5)
GLUCOSE BLDC GLUCOMTR-MCNC: 116 MG/DL (ref 70–130)
HCT VFR BLD AUTO: 36.2 % (ref 34.5–44)
HGB BLD-MCNC: 11.9 G/DL (ref 11.5–15.5)
LDH SERPL-CCNC: 156 U/L (ref 120–246)
MCH RBC QN AUTO: 30.2 PG (ref 27–31)
MCHC RBC AUTO-ENTMCNC: 32.9 G/DL (ref 32–36)
MCV RBC AUTO: 91.9 FL (ref 80–99)
PLATELET # BLD AUTO: 212 10*3/MM3 (ref 150–450)
PMV BLD AUTO: 10.2 FL (ref 6–12)
RBC # BLD AUTO: 3.94 10*6/MM3 (ref 3.89–5.14)
RH BLD: POSITIVE
T&S EXPIRATION DATE: NORMAL
URATE SERPL-MCNC: 4.4 MG/DL (ref 3.1–7.8)
WBC NRBC COR # BLD: 11.01 10*3/MM3 (ref 3.5–10.8)

## 2018-09-04 PROCEDURE — 82247 BILIRUBIN TOTAL: CPT | Performed by: OBSTETRICS & GYNECOLOGY

## 2018-09-04 PROCEDURE — 76816 OB US FOLLOW-UP PER FETUS: CPT | Performed by: OBSTETRICS & GYNECOLOGY

## 2018-09-04 PROCEDURE — 82565 ASSAY OF CREATININE: CPT | Performed by: OBSTETRICS & GYNECOLOGY

## 2018-09-04 PROCEDURE — 84460 ALANINE AMINO (ALT) (SGPT): CPT | Performed by: OBSTETRICS & GYNECOLOGY

## 2018-09-04 PROCEDURE — 84075 ASSAY ALKALINE PHOSPHATASE: CPT | Performed by: OBSTETRICS & GYNECOLOGY

## 2018-09-04 PROCEDURE — 86901 BLOOD TYPING SEROLOGIC RH(D): CPT | Performed by: OBSTETRICS & GYNECOLOGY

## 2018-09-04 PROCEDURE — 25010000002 BETAMETHASONE ACET & SOD PHOS PER 4 MG: Performed by: OBSTETRICS & GYNECOLOGY

## 2018-09-04 PROCEDURE — 86900 BLOOD TYPING SEROLOGIC ABO: CPT | Performed by: OBSTETRICS & GYNECOLOGY

## 2018-09-04 PROCEDURE — 82962 GLUCOSE BLOOD TEST: CPT

## 2018-09-04 PROCEDURE — 83615 LACTATE (LD) (LDH) ENZYME: CPT | Performed by: OBSTETRICS & GYNECOLOGY

## 2018-09-04 PROCEDURE — 76819 FETAL BIOPHYS PROFIL W/O NST: CPT

## 2018-09-04 PROCEDURE — 84450 TRANSFERASE (AST) (SGOT): CPT | Performed by: OBSTETRICS & GYNECOLOGY

## 2018-09-04 PROCEDURE — 76816 OB US FOLLOW-UP PER FETUS: CPT

## 2018-09-04 PROCEDURE — 76819 FETAL BIOPHYS PROFIL W/O NST: CPT | Performed by: OBSTETRICS & GYNECOLOGY

## 2018-09-04 PROCEDURE — 85027 COMPLETE CBC AUTOMATED: CPT | Performed by: OBSTETRICS & GYNECOLOGY

## 2018-09-04 PROCEDURE — 59025 FETAL NON-STRESS TEST: CPT

## 2018-09-04 PROCEDURE — 84550 ASSAY OF BLOOD/URIC ACID: CPT | Performed by: OBSTETRICS & GYNECOLOGY

## 2018-09-04 PROCEDURE — 86850 RBC ANTIBODY SCREEN: CPT | Performed by: OBSTETRICS & GYNECOLOGY

## 2018-09-04 RX ORDER — BISACODYL 10 MG
10 SUPPOSITORY, RECTAL RECTAL DAILY PRN
Status: DISCONTINUED | OUTPATIENT
Start: 2018-09-04 | End: 2018-09-06 | Stop reason: HOSPADM

## 2018-09-04 RX ORDER — PRENATAL VIT/IRON FUM/FOLIC AC 27MG-0.8MG
1 TABLET ORAL DAILY
Status: DISCONTINUED | OUTPATIENT
Start: 2018-09-04 | End: 2018-09-06 | Stop reason: HOSPADM

## 2018-09-04 RX ORDER — ONDANSETRON 4 MG/1
4 TABLET, FILM COATED ORAL EVERY 8 HOURS PRN
Status: DISCONTINUED | OUTPATIENT
Start: 2018-09-04 | End: 2018-09-06 | Stop reason: HOSPADM

## 2018-09-04 RX ORDER — SODIUM CHLORIDE, SODIUM LACTATE, POTASSIUM CHLORIDE, CALCIUM CHLORIDE 600; 310; 30; 20 MG/100ML; MG/100ML; MG/100ML; MG/100ML
125 INJECTION, SOLUTION INTRAVENOUS CONTINUOUS
Status: DISCONTINUED | OUTPATIENT
Start: 2018-09-04 | End: 2018-09-09 | Stop reason: HOSPADM

## 2018-09-04 RX ORDER — BETAMETHASONE SODIUM PHOSPHATE AND BETAMETHASONE ACETATE 3; 3 MG/ML; MG/ML
12 INJECTION, SUSPENSION INTRA-ARTICULAR; INTRALESIONAL; INTRAMUSCULAR; SOFT TISSUE EVERY 24 HOURS
Status: COMPLETED | OUTPATIENT
Start: 2018-09-04 | End: 2018-09-05

## 2018-09-04 RX ORDER — ACETAMINOPHEN 325 MG/1
650 TABLET ORAL EVERY 6 HOURS PRN
COMMUNITY
End: 2018-09-09 | Stop reason: HOSPADM

## 2018-09-04 RX ORDER — SODIUM CHLORIDE 0.9 % (FLUSH) 0.9 %
1-10 SYRINGE (ML) INJECTION AS NEEDED
Status: DISCONTINUED | OUTPATIENT
Start: 2018-09-04 | End: 2018-09-06 | Stop reason: HOSPADM

## 2018-09-04 RX ORDER — ACETAMINOPHEN 500 MG
1000 TABLET ORAL EVERY 6 HOURS PRN
Status: DISCONTINUED | OUTPATIENT
Start: 2018-09-04 | End: 2018-09-09 | Stop reason: HOSPADM

## 2018-09-04 RX ORDER — LIDOCAINE HYDROCHLORIDE 10 MG/ML
5 INJECTION, SOLUTION EPIDURAL; INFILTRATION; INTRACAUDAL; PERINEURAL AS NEEDED
Status: DISCONTINUED | OUTPATIENT
Start: 2018-09-04 | End: 2018-09-06 | Stop reason: HOSPADM

## 2018-09-04 RX ADMIN — ACETAMINOPHEN 1000 MG: 500 TABLET, FILM COATED ORAL at 21:12

## 2018-09-04 RX ADMIN — SODIUM CHLORIDE, POTASSIUM CHLORIDE, SODIUM LACTATE AND CALCIUM CHLORIDE 125 ML/HR: 600; 310; 30; 20 INJECTION, SOLUTION INTRAVENOUS at 21:25

## 2018-09-04 RX ADMIN — SODIUM CHLORIDE, POTASSIUM CHLORIDE, SODIUM LACTATE AND CALCIUM CHLORIDE 125 ML/HR: 600; 310; 30; 20 INJECTION, SOLUTION INTRAVENOUS at 11:50

## 2018-09-04 RX ADMIN — SODIUM CHLORIDE, POTASSIUM CHLORIDE, SODIUM LACTATE AND CALCIUM CHLORIDE 125 ML/HR: 600; 310; 30; 20 INJECTION, SOLUTION INTRAVENOUS at 15:28

## 2018-09-04 RX ADMIN — BETAMETHASONE SODIUM PHOSPHATE AND BETAMETHASONE ACETATE 12 MG: 3; 3 INJECTION, SUSPENSION INTRA-ARTICULAR; INTRALESIONAL; INTRAMUSCULAR at 18:27

## 2018-09-04 NOTE — H&P
ILA Parrish  Obstetric History and Physical    No chief complaint on file.      Subjective     Patient is a 37 y.o. female  currently at 35w2d, who presents with oligohydramnios and has had steroids .    Her prenatal care is benign.  Her previous obstetric/gynecological history is noted for is non-contributory.    The following portions of the patients history were reviewed and updated as appropriate: current medications .       Prenatal Information:  Prenatal Results     Initial Prenatal Labs     Test Value Reference Range Date Time    Hemoglobin        Hematocrit        Platelets 224 10*3/mm3 150 - 450 10*3/mm3 18 0958    Rubella IgG Immune   04/10/16     Hepatitis B SAg Negative   04/10/16     Hepatitis C Ab        RPR Non-Reactive   04/10/16     ABO B   18 2253    Rh Positive   18 2253    Antibody Screen        HIV        Urine Culture        Gonorrhea Negative   16     Chlamydia Negative   16     TSH              2nd and 3rd Trimester     Test Value Reference Range Date Time    Hemoglobin (repeated) 11.6 g/dL 11.5 - 15.5 g/dL 18 0958    Hematocrit (repeated) 35.6 % 34.5 - 44.0 % 18 0958    GCT        Antibody Screen (repeated) Negative   18 2253    GTT Fasting        GTT 1 Hr 140   16     GTT 2 Hr        GTT 3 Hr        Group B Strep              Drug Screening     Test Value Reference Range Date Time    Amphetamine Screen        Barbiturate Screen        Benzodiazepine Screen        Methadone Screen        Phencyclidine Screen        Opiates Screen        THC Screen        Cocaine Screen        Propoxyphene Screen        Buprenorphine Screen        Methamphetamine Screen        Oxycodone Screen        Tryicyclic Antidepressants Screen              Other (Risk screening)     Test Value Reference Range Date Time    Varicella IgG        Parvovirus IgG        CMV IgG        Cystic Fibrosis        Hemoglobin electrophoresis        NIPT        MSAFP-4         AFP (for NTD only)                  External Prenatal Results     Pregnancy Outside Results - Transcribed From Office Records - See Scanned Records For Details     Test Value Date Time    Hgb 11.6 g/dL 18 0958    Hct 35.6 % 18 0958    ABO B  18 2253    Rh Positive  18 2253    Antibody Screen Negative  18 2253    Glucose Fasting GTT       Glucose Tolerance Test 1 hour 140  16     Glucose Tolerance Test 3 hour       Gonorrhea (discrete) Negative  16     Chlamydia (discrete) Negative  16     RPR Non-Reactive  04/10/16     VDRL       Syphilis Antibody       Rubella Immune  04/10/16     HBsAg Negative  04/10/16     Herpes Simplex Virus PCR       Herpes Simplex VIrus Culture       HIV       Hep C RNA Quant PCR       Hep C Antibody       AFP       Group B Strep Negative  16     GBS Susceptibility to Clindamycin       GBS Susceptibility to Erythromycin       Fetal Fibronectin       Genetic Testing, Maternal Blood             Drug Screening     Test Value Date Time    Urine Drug Screen Negative  16     Amphetamine Screen       Barbiturate Screen       Benzodiazepine Screen       Methadone Screen       Phencyclidine Screen       Opiates Screen       THC Screen       Cocaine Screen       Propoxyphene Screen       Buprenorphine Screen       Methamphetamine Screen       Oxycodone Screen       Tricyclic Antidepressants Screen                    Past OB History:     Obstetric History       T1      L1     SAB0   TAB0   Ectopic0   Molar0   Multiple0   Live Births1       # Outcome Date GA Lbr Ryland/2nd Weight Sex Delivery Anes PTL Lv   3 Current            2 Term 16 38w5d  3348 g (7 lb 6.1 oz) M CS-LTranv Spinal N MARY      Name: LUIS FERNANDO BENEDRSHWETA      Apgar1:  8                Apgar5: 9   1 AB  5w0d                 Past Medical History: Past Medical History:   Diagnosis Date   • Anemia    • Gestational diabetes    • Wears glasses       Past Surgical  History Past Surgical History:   Procedure Laterality Date   • INDUCED       EAB - CONFIDENTIAL   • TIBIA FRACTURE SURGERY Right     rods in place   • WISDOM TOOTH EXTRACTION        Family History: Family History   Problem Relation Age of Onset   • Cancer Father    • Liver cancer Father    • Heart attack Sister    • Heart attack Paternal Grandfather    • Cancer Paternal Grandmother    • Cancer Maternal Grandmother    • Diabetes Maternal Grandfather    • Cancer Maternal Grandfather       Social History:  reports that she quit smoking about 2 years ago. Her smoking use included Cigarettes. She has never used smokeless tobacco.   reports that she does not drink alcohol.   reports that she does not use drugs.        General ROS: Pertinent items are noted in HPI    Objective       Vital Signs Range for the last 24 hours  Temperature:     Temp Source:     BP:     Pulse:     Respirations:     SPO2:     O2 Amount (l/min):     O2 Devices     Weight:       Physical Examination: General appearance - alert, well appearing, and in no distress    Presentation: vtx   Cervix: Exam by:     Dilation:     Effacement:     Station:         Fetal Heart Rate Assessment   Method:     Beats/min:     Baseline:     Varibility:     Accels:     Decels:     Tracing Category:       Uterine Assessment   Method:     Frequency (min):     Ctx Count in 10 min:     Duration:     Intensity:     Intensity by IUPC:     Resting Tone:     Resting Tone by IUPC:     [place] Units:       Laboratory Results:   Radiology Review:   Other Studies:     Assessment/Plan     Active Problems:    * No active hospital problems. *        Assessment:  1.  Intrauterine pregnancy at 35w2d weeks gestation with reactive fetal status.    2.  Recurrent oligohydramnios  3.  Obstetrical history significant for is non-contributory.  4.  GBS status: No results found for: GBSANTIGEN    Plan:  1. IVFs and PDC consult and us  2. Plan of care has been reviewed with  patient and fam  3.  Risks, benefits of treatment plan have been discussed.  4.  All questions have been answered.  5.        Lauren Jane MD  9/4/2018  10:50 AM

## 2018-09-05 LAB
GLUCOSE BLDC GLUCOMTR-MCNC: 109 MG/DL (ref 70–130)
GLUCOSE BLDC GLUCOMTR-MCNC: 110 MG/DL (ref 70–130)

## 2018-09-05 PROCEDURE — 25010000002 BETAMETHASONE ACET & SOD PHOS PER 4 MG: Performed by: OBSTETRICS & GYNECOLOGY

## 2018-09-05 PROCEDURE — 82962 GLUCOSE BLOOD TEST: CPT

## 2018-09-05 PROCEDURE — 59025 FETAL NON-STRESS TEST: CPT

## 2018-09-05 RX ORDER — DOCUSATE SODIUM 100 MG/1
100 CAPSULE, LIQUID FILLED ORAL 2 TIMES DAILY
Status: DISCONTINUED | OUTPATIENT
Start: 2018-09-05 | End: 2018-09-09 | Stop reason: HOSPADM

## 2018-09-05 RX ADMIN — DOCUSATE SODIUM 100 MG: 100 CAPSULE, LIQUID FILLED ORAL at 10:56

## 2018-09-05 RX ADMIN — PRENATAL VIT W/ FE FUMARATE-FA TAB 27-0.8 MG 1 TABLET: 27-0.8 TAB at 08:09

## 2018-09-05 RX ADMIN — ACETAMINOPHEN 1000 MG: 500 TABLET, FILM COATED ORAL at 19:45

## 2018-09-05 RX ADMIN — BETAMETHASONE SODIUM PHOSPHATE AND BETAMETHASONE ACETATE 12 MG: 3; 3 INJECTION, SUSPENSION INTRA-ARTICULAR; INTRALESIONAL; INTRAMUSCULAR at 18:35

## 2018-09-05 RX ADMIN — SODIUM CHLORIDE, POTASSIUM CHLORIDE, SODIUM LACTATE AND CALCIUM CHLORIDE 125 ML/HR: 600; 310; 30; 20 INJECTION, SOLUTION INTRAVENOUS at 03:10

## 2018-09-05 RX ADMIN — DOCUSATE SODIUM 100 MG: 100 CAPSULE, LIQUID FILLED ORAL at 22:01

## 2018-09-05 RX ADMIN — SODIUM CHLORIDE, POTASSIUM CHLORIDE, SODIUM LACTATE AND CALCIUM CHLORIDE 125 ML/HR: 600; 310; 30; 20 INJECTION, SOLUTION INTRAVENOUS at 10:55

## 2018-09-05 RX ADMIN — SODIUM CHLORIDE, POTASSIUM CHLORIDE, SODIUM LACTATE AND CALCIUM CHLORIDE 125 ML/HR: 600; 310; 30; 20 INJECTION, SOLUTION INTRAVENOUS at 19:44

## 2018-09-05 RX ADMIN — ACETAMINOPHEN 1000 MG: 500 TABLET, FILM COATED ORAL at 04:59

## 2018-09-05 NOTE — PLAN OF CARE
Problem: Patient Care Overview  Goal: Plan of Care Review  Outcome: Ongoing (interventions implemented as appropriate)   09/05/18 0630   Coping/Psychosocial   Plan of Care Reviewed With patient   Plan of Care Review   Progress no change       Problem: Prenatal Patient, Hospitalized (Adult,Obstetrics,Pediatric)  Goal: Signs and Symptoms of Listed Potential Problems Will be Absent, Minimized or Managed (Prenatal Patient, Hospitalized)  Outcome: Ongoing (interventions implemented as appropriate)   09/05/18 0630   Goal/Outcome Evaluation   Problems Assessed (Prenatal Patient) all   Problems Present (Prenatal Patient) none  (IV fluids and Cont EFM.)

## 2018-09-05 NOTE — PLAN OF CARE
Problem: Patient Care Overview  Goal: Plan of Care Review  Outcome: Ongoing (interventions implemented as appropriate)   09/05/18 0944   Coping/Psychosocial   Plan of Care Reviewed With patient   Plan of Care Review   Progress improving     Goal: Individualization and Mutuality  Outcome: Ongoing (interventions implemented as appropriate)   09/05/18 0944   Individualization   Patient Specific Goals (Include Timeframe) healthy mom & healthy baby   Patient Specific Interventions cont monitoring     Goal: Discharge Needs Assessment  Outcome: Ongoing (interventions implemented as appropriate)   09/05/18 0944   Discharge Needs Assessment   Concerns to be Addressed denies needs/concerns at this time       Problem: Prenatal Patient, Hospitalized (Adult,Obstetrics,Pediatric)  Intervention: Promote/Monitor Maternal/Fetal Wellbeing   09/05/18 0944   Reproductive Interventions   Fetal Wellbeing Promotion fetal heart rate monitored       Goal: Signs and Symptoms of Listed Potential Problems Will be Absent, Minimized or Managed (Prenatal Patient, Hospitalized)  Outcome: Ongoing (interventions implemented as appropriate)   09/05/18 0944   Goal/Outcome Evaluation   Problems Assessed (Prenatal Patient) all   Problems Present (Prenatal Patient) Low CR

## 2018-09-06 ENCOUNTER — APPOINTMENT (OUTPATIENT)
Dept: WOMENS IMAGING | Facility: HOSPITAL | Age: 38
End: 2018-09-06

## 2018-09-06 ENCOUNTER — ANESTHESIA EVENT (OUTPATIENT)
Dept: LABOR AND DELIVERY | Facility: HOSPITAL | Age: 38
End: 2018-09-06

## 2018-09-06 ENCOUNTER — ANESTHESIA (OUTPATIENT)
Dept: LABOR AND DELIVERY | Facility: HOSPITAL | Age: 38
End: 2018-09-06

## 2018-09-06 LAB — GLUCOSE BLDC GLUCOMTR-MCNC: 103 MG/DL (ref 70–130)

## 2018-09-06 PROCEDURE — 76819 FETAL BIOPHYS PROFIL W/O NST: CPT

## 2018-09-06 PROCEDURE — 25010000002 ONDANSETRON PER 1 MG: Performed by: NURSE ANESTHETIST, CERTIFIED REGISTERED

## 2018-09-06 PROCEDURE — 59025 FETAL NON-STRESS TEST: CPT

## 2018-09-06 PROCEDURE — 82962 GLUCOSE BLOOD TEST: CPT

## 2018-09-06 PROCEDURE — 88307 TISSUE EXAM BY PATHOLOGIST: CPT | Performed by: OBSTETRICS & GYNECOLOGY

## 2018-09-06 PROCEDURE — 25010000002 MIDAZOLAM PER 1 MG: Performed by: NURSE ANESTHETIST, CERTIFIED REGISTERED

## 2018-09-06 PROCEDURE — 25010000002 FENTANYL CITRATE (PF) 100 MCG/2ML SOLUTION: Performed by: NURSE ANESTHETIST, CERTIFIED REGISTERED

## 2018-09-06 PROCEDURE — 25010000003 MORPHINE PER 10 MG: Performed by: NURSE ANESTHETIST, CERTIFIED REGISTERED

## 2018-09-06 PROCEDURE — 76819 FETAL BIOPHYS PROFIL W/O NST: CPT | Performed by: OBSTETRICS & GYNECOLOGY

## 2018-09-06 RX ORDER — PROMETHAZINE HYDROCHLORIDE 12.5 MG/1
12.5 SUPPOSITORY RECTAL EVERY 6 HOURS PRN
Status: DISCONTINUED | OUTPATIENT
Start: 2018-09-06 | End: 2018-09-09 | Stop reason: HOSPADM

## 2018-09-06 RX ORDER — OXYTOCIN 10 [USP'U]/ML
INJECTION, SOLUTION INTRAMUSCULAR; INTRAVENOUS AS NEEDED
Status: DISCONTINUED | OUTPATIENT
Start: 2018-09-06 | End: 2018-09-06 | Stop reason: SURG

## 2018-09-06 RX ORDER — PRENATAL VIT/IRON FUM/FOLIC AC 27MG-0.8MG
1 TABLET ORAL DAILY
Status: DISCONTINUED | OUTPATIENT
Start: 2018-09-06 | End: 2018-09-09 | Stop reason: HOSPADM

## 2018-09-06 RX ORDER — PROMETHAZINE HYDROCHLORIDE 25 MG/1
25 TABLET ORAL EVERY 6 HOURS PRN
Status: DISCONTINUED | OUTPATIENT
Start: 2018-09-06 | End: 2018-09-09 | Stop reason: HOSPADM

## 2018-09-06 RX ORDER — IBUPROFEN 600 MG/1
600 TABLET ORAL EVERY 6 HOURS PRN
Status: DISCONTINUED | OUTPATIENT
Start: 2018-09-06 | End: 2018-09-09 | Stop reason: HOSPADM

## 2018-09-06 RX ORDER — ONDANSETRON 2 MG/ML
4 INJECTION INTRAMUSCULAR; INTRAVENOUS ONCE AS NEEDED
Status: DISCONTINUED | OUTPATIENT
Start: 2018-09-06 | End: 2018-09-06 | Stop reason: HOSPADM

## 2018-09-06 RX ORDER — ONDANSETRON 2 MG/ML
INJECTION INTRAMUSCULAR; INTRAVENOUS AS NEEDED
Status: DISCONTINUED | OUTPATIENT
Start: 2018-09-06 | End: 2018-09-06 | Stop reason: SURG

## 2018-09-06 RX ORDER — OXYCODONE AND ACETAMINOPHEN 7.5; 325 MG/1; MG/1
1 TABLET ORAL EVERY 4 HOURS PRN
Status: COMPLETED | OUTPATIENT
Start: 2018-09-06 | End: 2018-09-06

## 2018-09-06 RX ORDER — DIPHENHYDRAMINE HYDROCHLORIDE 50 MG/ML
25 INJECTION INTRAMUSCULAR; INTRAVENOUS EVERY 4 HOURS PRN
Status: DISCONTINUED | OUTPATIENT
Start: 2018-09-06 | End: 2018-09-09 | Stop reason: HOSPADM

## 2018-09-06 RX ORDER — BUPIVACAINE HYDROCHLORIDE 5 MG/ML
INJECTION, SOLUTION EPIDURAL; INTRACAUDAL AS NEEDED
Status: DISCONTINUED | OUTPATIENT
Start: 2018-09-06 | End: 2018-09-06 | Stop reason: SURG

## 2018-09-06 RX ORDER — PROMETHAZINE HYDROCHLORIDE 25 MG/ML
12.5 INJECTION, SOLUTION INTRAMUSCULAR; INTRAVENOUS EVERY 6 HOURS PRN
Status: DISCONTINUED | OUTPATIENT
Start: 2018-09-06 | End: 2018-09-09 | Stop reason: HOSPADM

## 2018-09-06 RX ORDER — MIDAZOLAM HYDROCHLORIDE 1 MG/ML
INJECTION INTRAMUSCULAR; INTRAVENOUS AS NEEDED
Status: DISCONTINUED | OUTPATIENT
Start: 2018-09-06 | End: 2018-09-06 | Stop reason: SURG

## 2018-09-06 RX ORDER — NALOXONE HCL 0.4 MG/ML
0.4 VIAL (ML) INJECTION
Status: ACTIVE | OUTPATIENT
Start: 2018-09-06 | End: 2018-09-07

## 2018-09-06 RX ORDER — HYDROCODONE BITARTRATE AND ACETAMINOPHEN 5; 325 MG/1; MG/1
1 TABLET ORAL EVERY 4 HOURS PRN
Status: DISCONTINUED | OUTPATIENT
Start: 2018-09-06 | End: 2018-09-09 | Stop reason: HOSPADM

## 2018-09-06 RX ORDER — IBUPROFEN 600 MG/1
600 TABLET ORAL EVERY 6 HOURS PRN
Status: DISCONTINUED | OUTPATIENT
Start: 2018-09-06 | End: 2018-09-06 | Stop reason: HOSPADM

## 2018-09-06 RX ORDER — DOCUSATE SODIUM 100 MG/1
100 CAPSULE, LIQUID FILLED ORAL 2 TIMES DAILY PRN
Status: DISCONTINUED | OUTPATIENT
Start: 2018-09-06 | End: 2018-09-09 | Stop reason: HOSPADM

## 2018-09-06 RX ORDER — LANOLIN 100 %
OINTMENT (GRAM) TOPICAL
Status: DISCONTINUED | OUTPATIENT
Start: 2018-09-06 | End: 2018-09-09 | Stop reason: HOSPADM

## 2018-09-06 RX ORDER — MORPHINE SULFATE 0.5 MG/ML
INJECTION, SOLUTION EPIDURAL; INTRATHECAL; INTRAVENOUS AS NEEDED
Status: DISCONTINUED | OUTPATIENT
Start: 2018-09-06 | End: 2018-09-06 | Stop reason: SURG

## 2018-09-06 RX ORDER — OXYCODONE HYDROCHLORIDE AND ACETAMINOPHEN 5; 325 MG/1; MG/1
1 TABLET ORAL EVERY 4 HOURS PRN
Status: DISCONTINUED | OUTPATIENT
Start: 2018-09-06 | End: 2018-09-09 | Stop reason: HOSPADM

## 2018-09-06 RX ORDER — CLINDAMYCIN PHOSPHATE 900 MG/50ML
900 INJECTION INTRAVENOUS ONCE
Status: COMPLETED | OUTPATIENT
Start: 2018-09-06 | End: 2018-09-06

## 2018-09-06 RX ORDER — FENTANYL CITRATE 50 UG/ML
INJECTION, SOLUTION INTRAMUSCULAR; INTRAVENOUS AS NEEDED
Status: DISCONTINUED | OUTPATIENT
Start: 2018-09-06 | End: 2018-09-06 | Stop reason: SURG

## 2018-09-06 RX ORDER — FENTANYL CITRATE 50 UG/ML
50 INJECTION, SOLUTION INTRAMUSCULAR; INTRAVENOUS
Status: DISCONTINUED | OUTPATIENT
Start: 2018-09-06 | End: 2018-09-06 | Stop reason: HOSPADM

## 2018-09-06 RX ORDER — SIMETHICONE 80 MG
80 TABLET,CHEWABLE ORAL
Status: DISCONTINUED | OUTPATIENT
Start: 2018-09-06 | End: 2018-09-09 | Stop reason: HOSPADM

## 2018-09-06 RX ORDER — DIPHENHYDRAMINE HCL 25 MG
25 CAPSULE ORAL EVERY 4 HOURS PRN
Status: DISCONTINUED | OUTPATIENT
Start: 2018-09-06 | End: 2018-09-09 | Stop reason: HOSPADM

## 2018-09-06 RX ORDER — SODIUM CHLORIDE 0.9 % (FLUSH) 0.9 %
1-10 SYRINGE (ML) INJECTION AS NEEDED
Status: DISCONTINUED | OUTPATIENT
Start: 2018-09-06 | End: 2018-09-09 | Stop reason: HOSPADM

## 2018-09-06 RX ORDER — TRISODIUM CITRATE DIHYDRATE AND CITRIC ACID MONOHYDRATE 500; 334 MG/5ML; MG/5ML
30 SOLUTION ORAL ONCE
Status: COMPLETED | OUTPATIENT
Start: 2018-09-06 | End: 2018-09-06

## 2018-09-06 RX ADMIN — CLINDAMYCIN PHOSPHATE 900 MG: 900 INJECTION, SOLUTION INTRAVENOUS at 09:13

## 2018-09-06 RX ADMIN — ONDANSETRON 4 MG: 2 INJECTION INTRAMUSCULAR; INTRAVENOUS at 10:18

## 2018-09-06 RX ADMIN — MORPHINE SULFATE 0.15 MG: 0.5 INJECTION, SOLUTION EPIDURAL; INTRATHECAL; INTRAVENOUS at 09:48

## 2018-09-06 RX ADMIN — SODIUM CITRATE AND CITRIC ACID MONOHYDRATE 30 ML: 500; 334 SOLUTION ORAL at 09:32

## 2018-09-06 RX ADMIN — OXYCODONE HYDROCHLORIDE AND ACETAMINOPHEN 1 TABLET: 5; 325 TABLET ORAL at 22:23

## 2018-09-06 RX ADMIN — OXYCODONE HYDROCHLORIDE AND ACETAMINOPHEN 1 TABLET: 5; 325 TABLET ORAL at 18:01

## 2018-09-06 RX ADMIN — OXYCODONE HYDROCHLORIDE AND ACETAMINOPHEN 1 TABLET: 7.5; 325 TABLET ORAL at 11:48

## 2018-09-06 RX ADMIN — SIMETHICONE CHEW TAB 80 MG 80 MG: 80 TABLET ORAL at 20:02

## 2018-09-06 RX ADMIN — SODIUM CHLORIDE, POTASSIUM CHLORIDE, SODIUM LACTATE AND CALCIUM CHLORIDE 1000 ML/HR: 600; 310; 30; 20 INJECTION, SOLUTION INTRAVENOUS at 08:39

## 2018-09-06 RX ADMIN — FENTANYL CITRATE 85 MCG: 50 INJECTION, SOLUTION INTRAMUSCULAR; INTRAVENOUS at 10:16

## 2018-09-06 RX ADMIN — SODIUM CHLORIDE, POTASSIUM CHLORIDE, SODIUM LACTATE AND CALCIUM CHLORIDE 125 ML/HR: 600; 310; 30; 20 INJECTION, SOLUTION INTRAVENOUS at 03:10

## 2018-09-06 RX ADMIN — MIDAZOLAM HYDROCHLORIDE 1 MG: 1 INJECTION, SOLUTION INTRAMUSCULAR; INTRAVENOUS at 09:41

## 2018-09-06 RX ADMIN — DOCUSATE SODIUM 100 MG: 100 CAPSULE, LIQUID FILLED ORAL at 20:02

## 2018-09-06 RX ADMIN — SIMETHICONE CHEW TAB 80 MG 80 MG: 80 TABLET ORAL at 14:00

## 2018-09-06 RX ADMIN — SODIUM CHLORIDE, POTASSIUM CHLORIDE, SODIUM LACTATE AND CALCIUM CHLORIDE 125 ML/HR: 600; 310; 30; 20 INJECTION, SOLUTION INTRAVENOUS at 14:00

## 2018-09-06 RX ADMIN — IBUPROFEN 600 MG: 600 TABLET ORAL at 22:23

## 2018-09-06 RX ADMIN — Medication 3 APPLICATION: at 15:18

## 2018-09-06 RX ADMIN — OXYTOCIN 30 UNITS: 10 INJECTION, SOLUTION INTRAMUSCULAR; INTRAVENOUS at 10:08

## 2018-09-06 RX ADMIN — SODIUM CHLORIDE, POTASSIUM CHLORIDE, SODIUM LACTATE AND CALCIUM CHLORIDE: 600; 310; 30; 20 INJECTION, SOLUTION INTRAVENOUS at 10:23

## 2018-09-06 RX ADMIN — SODIUM CHLORIDE, POTASSIUM CHLORIDE, SODIUM LACTATE AND CALCIUM CHLORIDE: 600; 310; 30; 20 INJECTION, SOLUTION INTRAVENOUS at 10:02

## 2018-09-06 RX ADMIN — IBUPROFEN 600 MG: 600 TABLET ORAL at 14:00

## 2018-09-06 RX ADMIN — MIDAZOLAM HYDROCHLORIDE 2 MG: 1 INJECTION, SOLUTION INTRAMUSCULAR; INTRAVENOUS at 10:19

## 2018-09-06 RX ADMIN — FENTANYL CITRATE 15 MCG: 50 INJECTION, SOLUTION INTRAMUSCULAR; INTRAVENOUS at 09:48

## 2018-09-06 RX ADMIN — EPHEDRINE SULFATE 10 MG: 50 INJECTION INTRAMUSCULAR; INTRAVENOUS; SUBCUTANEOUS at 09:51

## 2018-09-06 RX ADMIN — MIDAZOLAM HYDROCHLORIDE 2 MG: 1 INJECTION, SOLUTION INTRAMUSCULAR; INTRAVENOUS at 10:16

## 2018-09-06 RX ADMIN — BUPIVACAINE HYDROCHLORIDE 2 ML: 5 INJECTION, SOLUTION EPIDURAL; INTRACAUDAL; PERINEURAL at 09:48

## 2018-09-06 RX ADMIN — SODIUM CHLORIDE, POTASSIUM CHLORIDE, SODIUM LACTATE AND CALCIUM CHLORIDE 1000 ML/HR: 600; 310; 30; 20 INJECTION, SOLUTION INTRAVENOUS at 09:13

## 2018-09-06 RX ADMIN — SIMETHICONE CHEW TAB 80 MG 80 MG: 80 TABLET ORAL at 18:00

## 2018-09-06 RX ADMIN — OXYTOCIN 20 UNITS: 10 INJECTION, SOLUTION INTRAMUSCULAR; INTRAVENOUS at 10:23

## 2018-09-06 NOTE — ANESTHESIA PREPROCEDURE EVALUATION
Anesthesia Evaluation     Patient summary reviewed and Nursing notes reviewed   NPO Solid Status: > 6 hours  NPO Liquid Status: > 6 hours           Airway   Mallampati: II  TM distance: >3 FB  Neck ROM: full  No difficulty expected  Dental      Pulmonary    (+) a smoker,   Cardiovascular - negative cardio ROS        Neuro/Psych- negative ROS  GI/Hepatic/Renal/Endo    (+)   diabetes mellitus gestational,     Musculoskeletal (-) negative ROS    Abdominal    Substance History - negative use     OB/GYN    (+) Pregnant,         Other - negative ROS                       Anesthesia Plan    ASA 2     spinal and ITN     Anesthetic plan, all risks, benefits, and alternatives have been provided, discussed and informed consent has been obtained with: patient.

## 2018-09-06 NOTE — PROGRESS NOTES
"Adult Nutrition  Assessment/PES    Patient Name:  Hali Dennis  YOB: 1980  MRN: [id number]  Admit Date:  2018    Assessment Date:  2018    Comments:            Reason for Assessment     Row Name 18 1057          Reason for Assessment    Reason For Assessment identified at risk by screening criteria     Diagnosis other (see comments)   recurrent oligohydramnios, GDM             Nutrition/Diet History     Row Name 18 1057          Nutrition/Diet History    Typical Food/Fluid Intake Unable to speak with patient at time of visit, gone to OR for              Anthropometrics     Row Name 18 1057          Anthropometrics    Height Method measured     Height 160 cm (62.99\")        Admit Weight    Admit Weight Method measured     Admit Weight 74.4 kg (164 lb 0.4 oz)        Ideal Body Weight (IBW)    Ideal Body Weight (IBW) (kg) 52.7          Labs/Tests/Procedures/Meds     Row Name 18 1058          Labs/Procedures/Meds    Lab Results Reviewed reviewed               Estimated/Assessed Needs     Row Name 18 1057          Calculation Measurements    Height 160 cm (62.99\")             Nutrition Prescription Ordered     Row Name 18 1058          Nutrition Prescription PO    Current PO Diet Regular     Common Modifiers Consistent Carbohydrate             Evaluation of Received Nutrient/Fluid Intake     Row Name 18 1058 18 1057       Calculation Measurements    Height  -- 160 cm (62.99\")       PO Evaluation    Number of Days PO Intake Evaluated Insufficient Data  --            Evaluation of Prescribed Nutrient/Fluid Intake     Row Name 18 1057          Calculation Measurements    Height 160 cm (62.99\")           Problem/Interventions:          Problem 2     Row Name 18 1058          Nutrition Diagnoses Problem 2    Problem 2 No Nutrition Diagnosis at this Time                   Intervention Goal     Row Name 18 1058          " Intervention Goal    General Nutrition support treatment             Nutrition Intervention     Row Name 09/06/18 1058          Nutrition Intervention    RD/Tech Action Follow Tx progress;Care plan reviewd               Education/Evaluation     Row Name 09/06/18 1059          Monitor/Evaluation    Monitor Per protocol;PO intake;Pertinent labs         Electronically signed by:  Darling Coe RD  09/06/18 10:59 AM   Time Spent:  20 minutes

## 2018-09-06 NOTE — ANESTHESIA POSTPROCEDURE EVALUATION
Patient: Hali Dennis    Procedure Summary     Date:  18 Room / Location:  [facility] LABOR DELIVERY   [facility]IV[facility]    Anesthesia Start:  939 Anesthesia Stop:      Procedure:   SECTION REPEAT * 39 WKS * (N/A Abdomen) Diagnosis:      Surgeon:  Lauren Jane MD Provider:  Toma Roberts DO    Anesthesia Type:  spinal, ITN ASA Status:  2          Anesthesia Type: spinal, ITN  Last vitals  BP   119/66   Temp   98.1 °F (36.7 °C) (18 1044)   Pulse   64 (18 1044)   Resp   16 (18 1044)     SpO2 96%     Post Anesthesia Care and Evaluation    Patient location during evaluation: bedside  Patient participation: complete - patient participated  Level of consciousness: awake and alert  Pain score: 0  Pain management: adequate  Airway patency: patent  Anesthetic complications: No anesthetic complications    Cardiovascular status: acceptable  Respiratory status: acceptable  Hydration status: acceptable

## 2018-09-06 NOTE — PLAN OF CARE
Problem: Patient Care Overview  Goal: Plan of Care Review  Outcome: Ongoing (interventions implemented as appropriate)   09/06/18 1620   Coping/Psychosocial   Plan of Care Reviewed With patient;spouse   Plan of Care Review   Progress no change   OTHER   Outcome Summary Infant latches and breastfeeds very well considering 35 weeks 4 days gestation. He was able to latch and maintain suck pattern for 20 minutes. Patient is encouraged to pump after feedings, as needed to be able to provide 10 mL EBM supplement. She also has neosure to supplement if EBM is not available. She was able to pump 10 mL's the first pumping. She has a hospital pump in the room and was provided information about how to obtain a new home pump.       Problem: Breastfeeding (Adult,Obstetrics,Pediatric)  Intervention: Support Exclusive Breastfeeding Success   09/06/18 1620   Reproductive Interventions   Breastfeeding Assistance assisted with positioning;electric breast pump used;feeding cue recognition promoted;feeding on demand promoted;feeding session observed;hand expression;infant latch-on verified;support offered

## 2018-09-06 NOTE — ANESTHESIA PROCEDURE NOTES
Spinal Block    Indication:procedure for pain  Performed By  Anesthesiologist: NEEMA PEREZ  CRNA: RAMONA WHITING  Preanesthetic Checklist  Completed: patient identified, surgical consent, pre-op evaluation, timeout performed, IV checked, risks and benefits discussed and monitors and equipment checked  Spinal Block Prep:  Patient Position:sitting  Sterile Tech:cap, gloves, mask and sterile barriers  Prep:Betadine  Patient Monitoring:blood pressure monitoring, continuous pulse oximetry and EKG  Spinal Block Procedure  Approach:midline  Guidance:palpation technique  Location:L3-L4  Needle Type:Nikolai  Needle Gauge:25 G  Placement of Spinal needle event:cerebrospinal fluid aspirated  Paresthesia: no  Fluid Appearance:clear  Post Assessment  Patient Tolerance:patient tolerated the procedure well with no apparent complications  Complications no

## 2018-09-06 NOTE — POST-PROCEDURE NOTE
Brief ultrasound note    Single viable intrauterine pregnancy in the cephalic presentation.  The amniotic fluid volume is decreased  Amniotic Fluid Index (CR) equals 2.2 cm.  Biophysical Profile (BPP) equals 4/8  Umbilical artery S/D ratio= 2.34 to 1 (normal)    Given persistent oligohydramnios, status post primary and rescue course of steroids at the current gestational age without improvement following bedrest and hydration would advocate proceeding with delivery.

## 2018-09-06 NOTE — OP NOTE
Operative Note    Patient name: Hali Dennis  YOB: 1980   MRN: [id number]  Admission Date: 2018  Referring Provider: Lauren Jane MD    ID: 37 y.o.  at 35w4d    Preoperative Diagnosis:   Patient Active Problem List   Diagnosis   • Fetal arrhythmia affecting pregnancy, antepartum   •  delivery delivered   • Currently pregnant   • Breech presentation   • Anemia   • Previous  section   • Placenta previa in third trimester   • Oligohydramnios       Postoperative Diagnosis: Same as above.    Procedure(s): repeatlow transverse  delivery     Surgeons: Surgeon(s) and Role:     * Lauren Jane MD - Primary    Anesthesia: Spinal    Estimated Blood Loss: 1000 mL mL    IV Fluids:  mls    Preoperative antibiotic: Clindamycin 900 mg    Blood products:   Blood Administration Record     None          Pathology:   Order Name Source Comment Collection Info Order Time   TISSUE PATHOLOGY EXAM Placenta  Collected By: Lauren Jane MD 2018 10:20 AM       Drains: Mendez catheter to gravity    Complications: None    Condition: Stable to recovery room                                          Infant:                 Gender: male  infant    Weight: 2398 g (5 lb 4.6 oz)     Apgars: 8   @ 1 minute /     9   @ 5 minutes    Cord gases: Venous:  @BABYNOHDR(BRIEFLAB, PHCVEN, BECVEN)@     Arterial:  @BABYNOHDR(BRIEFLAB, PHCART, BECART)@         Operative Summary: Presented for primary  due to severe oligohydramnios.  She was 35 weeks pregnant.  Her said wrist complications and implications of the surgery including the possibility of bleeding infection damage to bowel bladder and ureter as well as postop issues like blood clots hematomas pneumonias.  She was taken to the OR given clindamycin at [facility] prepping draping and spinal anesthesia a Pfannenstiel skin incision made with a knife removing the old scar.  Easing carried down to the fascia which was nicked open and extended  with Bella scissors then bluntly and sharply dissected away from the rectus muscle.  This muscle was split in the midline peritoneum was picked up opened and extended and the abdominal cavity was entered Y blade was placed bladder flap created using elio and Darrian blunt dissection with fingers a low transverse uterine incision made with a knife extended with the fingers.  Rupture was clear baby's liver vertex cried on the abdomen was a male cork unclamp baby handed off pediatrician cord blood obtained placenta manually removed outside the uterus and sent to pathology.  The uterus placed outside the abdomen and the inside ears was cleaned there were several small vessels in the posterior wall the uterus were the placenta had been attached which was down low.  The uterine incision was then closed with #1 chromic running lockstitch in 1 layer at [facility] hemostasis and irrigation the uterus tubes and ovaries replaced back inside the abdomen and abdomen was copiously irrigated.  Incision was reinspected and was not bleeding the abdominal K was irrigated and the peritoneum was closed with 2-0 chromic running lock stitch.  She was closed 0 Vicryl running stitches and the 30 plain interrupted sutures placed subcutaneously.  Skin was closed with skin staples the patient uterus is expressible clots manually and vaginally she was taken recovery room good condition no, dictation blood loss was 8R cc she tolerated procedure very well and her family was made aware of her progress note should do fine .   this is Dr. Jane

## 2018-09-06 NOTE — POST-PROCEDURE NOTE
Documentation of the ultasound findings, images, and interpretations with be available in the patient's Viewpoint report located in the Chart Review Imaging tab in [place].

## 2018-09-07 LAB
BASOPHILS # BLD AUTO: 0.02 10*3/MM3 (ref 0–0.2)
BASOPHILS NFR BLD AUTO: 0.1 % (ref 0–1)
DEPRECATED RDW RBC AUTO: 47.7 FL (ref 37–54)
EOSINOPHIL # BLD AUTO: 0.01 10*3/MM3 (ref 0–0.3)
EOSINOPHIL NFR BLD AUTO: 0.1 % (ref 0–3)
ERYTHROCYTE [DISTWIDTH] IN BLOOD BY AUTOMATED COUNT: 13.6 % (ref 11.3–14.5)
HCT VFR BLD AUTO: 30.8 % (ref 34.5–44)
HGB BLD-MCNC: 10 G/DL (ref 11.5–15.5)
IMM GRANULOCYTES # BLD: 0.22 10*3/MM3 (ref 0–0.03)
IMM GRANULOCYTES NFR BLD: 1.5 % (ref 0–0.6)
LYMPHOCYTES # BLD AUTO: 2.46 10*3/MM3 (ref 0.6–4.8)
LYMPHOCYTES NFR BLD AUTO: 16.6 % (ref 24–44)
MCH RBC QN AUTO: 30.9 PG (ref 27–31)
MCHC RBC AUTO-ENTMCNC: 32.5 G/DL (ref 32–36)
MCV RBC AUTO: 95.1 FL (ref 80–99)
MONOCYTES # BLD AUTO: 1.12 10*3/MM3 (ref 0–1)
MONOCYTES NFR BLD AUTO: 7.6 % (ref 0–12)
NEUTROPHILS # BLD AUTO: 10.99 10*3/MM3 (ref 1.5–8.3)
NEUTROPHILS NFR BLD AUTO: 74.1 % (ref 41–71)
PLATELET # BLD AUTO: 191 10*3/MM3 (ref 150–450)
PMV BLD AUTO: 9.9 FL (ref 6–12)
RBC # BLD AUTO: 3.24 10*6/MM3 (ref 3.89–5.14)
WBC NRBC COR # BLD: 14.82 10*3/MM3 (ref 3.5–10.8)

## 2018-09-07 PROCEDURE — 85025 COMPLETE CBC W/AUTO DIFF WBC: CPT | Performed by: OBSTETRICS & GYNECOLOGY

## 2018-09-07 RX ADMIN — OXYCODONE HYDROCHLORIDE AND ACETAMINOPHEN 1 TABLET: 5; 325 TABLET ORAL at 14:33

## 2018-09-07 RX ADMIN — IBUPROFEN 600 MG: 600 TABLET ORAL at 18:09

## 2018-09-07 RX ADMIN — SIMETHICONE CHEW TAB 80 MG 80 MG: 80 TABLET ORAL at 12:16

## 2018-09-07 RX ADMIN — PRENATAL VIT W/ FE FUMARATE-FA TAB 27-0.8 MG 1 TABLET: 27-0.8 TAB at 10:02

## 2018-09-07 RX ADMIN — OXYCODONE HYDROCHLORIDE AND ACETAMINOPHEN 1 TABLET: 5; 325 TABLET ORAL at 06:18

## 2018-09-07 RX ADMIN — DOCUSATE SODIUM 100 MG: 100 CAPSULE, LIQUID FILLED ORAL at 10:02

## 2018-09-07 RX ADMIN — OXYCODONE HYDROCHLORIDE AND ACETAMINOPHEN 1 TABLET: 5; 325 TABLET ORAL at 10:02

## 2018-09-07 RX ADMIN — OXYCODONE HYDROCHLORIDE AND ACETAMINOPHEN 1 TABLET: 5; 325 TABLET ORAL at 23:47

## 2018-09-07 RX ADMIN — DOCUSATE SODIUM 100 MG: 100 CAPSULE, LIQUID FILLED ORAL at 21:14

## 2018-09-07 RX ADMIN — SIMETHICONE CHEW TAB 80 MG 80 MG: 80 TABLET ORAL at 10:02

## 2018-09-07 RX ADMIN — SIMETHICONE CHEW TAB 80 MG 80 MG: 80 TABLET ORAL at 18:09

## 2018-09-07 RX ADMIN — OXYCODONE HYDROCHLORIDE AND ACETAMINOPHEN 1 TABLET: 5; 325 TABLET ORAL at 18:09

## 2018-09-07 RX ADMIN — IBUPROFEN 600 MG: 600 TABLET ORAL at 06:18

## 2018-09-07 RX ADMIN — IBUPROFEN 600 MG: 600 TABLET ORAL at 12:16

## 2018-09-07 RX ADMIN — IBUPROFEN 600 MG: 600 TABLET ORAL at 23:47

## 2018-09-07 RX ADMIN — SIMETHICONE CHEW TAB 80 MG 80 MG: 80 TABLET ORAL at 21:14

## 2018-09-07 NOTE — LACTATION NOTE
This note was copied from a baby's chart.     09/07/18 1350   Nutrition   Feeding Tolerance/Success arousal required;coordinated suck;coordinated swallow   Feeding Interventions latch assistance provided;arousal required   Breastfeeding Session   Breastfeeding Time, Right (min) (assisted with latch, still nursing)   Breastfeeding breastfeeding, right side only   Infant Positioning clutch/football   Effective Latch During Feeding yes   Suck/Swallow Coordination present   Signs of Milk Transfer audible swallow

## 2018-09-07 NOTE — LACTATION NOTE
09/07/18 1315   Maternal Information   Date of Referral 09/07/18   Person Making Referral patient;nurse   Maternal Reason for Referral breastfeeding currently   Infant Reason for Referral 35-37 weeks gestation   Maternal Assessment   Breast Size Issue none   Breast Shape Bilateral:;round   Breast Density Bilateral:;soft   Nipples Bilateral:;everted   Maternal Infant Feeding   Maternal Emotional State anxious;assist needed   Infant Positioning clutch/football   Signs of Milk Transfer audible swallow;infant jaw motion present   Pain with Feeding no   Comfort Measures Before/During Feeding infant position adjusted;latch adjusted;maternal position adjusted   Nipple Shape After Feeding, Left Breast round;symmetrical;appropriately projected   Nipple Shape After Feeding, Right round;symmetrical;appropriately projected   Latch Assistance yes   Equipment Type   Breast Pump Type double electric, personal   Reproductive Interventions   Breastfeeding Assistance assisted with positioning;feeding on demand promoted;feeding session observed;infant latch-on verified;infant stimulated to wakeful state;infant suck/swallow verified;support offered   Breast Pumping   Breast Pumping Interventions post-feed pumping encouraged

## 2018-09-07 NOTE — PROGRESS NOTES
ILA Parrish   PROGRESS NOTE    Post-Op Day 1 S/P   Subjective     Patient reports:  Pain is  controlled with .  She is  ambulating. Tolerating diet. Tolerating po -- normal.  Intake -- c/o of tolerating po solids.   Voiding - without difficulty; flatus reported..  Vaginal bleeding is as much as expected.      Objective      Vitals: Vital Signs Range for the last 24 hours  Temperature: Temp:  [98.1 °F (36.7 °C)-98.7 °F (37.1 °C)] 98.5 °F (36.9 °C)   Temp Source: Temp src: Oral   BP: BP: (100-127)/(43-83) 119/68   Pulse: Heart Rate:  [58-86] 68   Respirations: Resp:  [16-20] 18   SPO2: SpO2:  [98 %-99 %] 98 %   O2 Amount (l/min):     O2 Devices     Weight:              Physical Exam    Lungs clear to auscultation bilaterally   Abdomen Soft, non-tender, normal bowel sounds; no bruits, organomegaly or masses.   Incision  healing well   Extremities extremities normal, atraumatic, no cyanosis or edema     I reviewed the patient's new clinical results.    Assessment/Plan      Active Problems:    Oligohydramnios      Assessment:    Hali L Ashleyguerita is Day 1  post-partum  , Low Transverse    .       Plan:  continue post op care.        Lauren Jane MD  2018  9:21 AM

## 2018-09-07 NOTE — ANESTHESIA POSTPROCEDURE EVALUATION
Patient: Hali Dennis    Procedure Summary     Date:  18 Room / Location:  [facility] LABOR DELIVERY   [facility]OR DELIVERY    Anesthesia Start:  939 Anesthesia Stop:      Procedure:   SECTION REPEAT * 39 WKS * (N/A Abdomen) Diagnosis:      Surgeon:  Lauren Jane MD Provider:  Toma Roberts DO    Anesthesia Type:  spinal, ITN ASA Status:  2          Anesthesia Type: spinal, ITN  Last vitals  BP   119/68 (18 0800)   Temp   98.5 °F (36.9 °C) (18 0800)   Pulse   68 (18 0800)   Resp   18 (18 0800)     SpO2   98 % (18 1150)     Post Anesthesia Care and Evaluation    Patient location during evaluation: bedside  Patient participation: complete - patient participated  Level of consciousness: awake and alert  Pain management: adequate  Airway patency: patent  Anesthetic complications: No anesthetic complications    Cardiovascular status: acceptable  Respiratory status: acceptable  Hydration status: acceptable  Post Neuraxial Block status: Motor and sensory function returned to baseline and No signs or symptoms of PDPH

## 2018-09-07 NOTE — LACTATION NOTE
This note was copied from a baby's chart.     09/07/18 1315   Nutrition   Feeding Readiness Cues rooting;eager   Feeding Method breastfeeding   Feeding Tolerance/Success coordinated suck;coordinated swallow   Feeding Interventions latch assistance provided;sucking promoted;arousal required   Nutrition Interventions lactation consult initiated   Additional Documentation LATCH Score (Group)   Breastfeeding Session   Breastfeeding breastfeeding, bilateral   Infant Positioning clutch/football   Breastfeeding Time, Left (min) 17   Effective Latch During Feeding yes   Suck/Swallow Coordination present   Signs of Milk Transfer infant jaw motion present   LATCH Score   Latch 2-->grasps breast, tongue down, lips flanged, rhythmic sucking   Audible Swallowing 1-->a few with stimulation   Type of Nipple 2-->everted (after stimulation)   Comfort (Breast/Nipple) 2-->soft/nontender   Hold (Positioning) 1-->minimal assist, teach one side, mother does other, staff holds   Score 8

## 2018-09-08 RX ADMIN — IBUPROFEN 600 MG: 600 TABLET ORAL at 13:08

## 2018-09-08 RX ADMIN — OXYCODONE HYDROCHLORIDE AND ACETAMINOPHEN 1 TABLET: 5; 325 TABLET ORAL at 15:55

## 2018-09-08 RX ADMIN — DOCUSATE SODIUM 100 MG: 100 CAPSULE, LIQUID FILLED ORAL at 07:53

## 2018-09-08 RX ADMIN — OXYCODONE HYDROCHLORIDE AND ACETAMINOPHEN 1 TABLET: 5; 325 TABLET ORAL at 06:49

## 2018-09-08 RX ADMIN — SIMETHICONE CHEW TAB 80 MG 80 MG: 80 TABLET ORAL at 07:53

## 2018-09-08 RX ADMIN — OXYCODONE HYDROCHLORIDE AND ACETAMINOPHEN 1 TABLET: 5; 325 TABLET ORAL at 10:54

## 2018-09-08 RX ADMIN — SIMETHICONE CHEW TAB 80 MG 80 MG: 80 TABLET ORAL at 15:55

## 2018-09-08 RX ADMIN — OXYCODONE HYDROCHLORIDE AND ACETAMINOPHEN 1 TABLET: 5; 325 TABLET ORAL at 22:00

## 2018-09-08 RX ADMIN — SIMETHICONE CHEW TAB 80 MG 80 MG: 80 TABLET ORAL at 13:08

## 2018-09-08 RX ADMIN — IBUPROFEN 600 MG: 600 TABLET ORAL at 06:49

## 2018-09-08 RX ADMIN — PRENATAL VIT W/ FE FUMARATE-FA TAB 27-0.8 MG 1 TABLET: 27-0.8 TAB at 07:53

## 2018-09-08 RX ADMIN — DOCUSATE SODIUM 100 MG: 100 CAPSULE, LIQUID FILLED ORAL at 15:55

## 2018-09-08 NOTE — PROGRESS NOTES
2018    Name:Hali Dennis    MR#:[id number]     PROGRESS NOTE:  Post-Op Day 2 S/P    HD:4    Subjective   37 y.o. yo Female  s/p CS at 35w4d doing well. Pain well controlled. Tolerating regular diet and having flatus. Lochia normal.     Patient Active Problem List   Diagnosis   • Fetal arrhythmia affecting pregnancy, antepartum   •  delivery delivered   • Currently pregnant   • Breech presentation   • Anemia   • Previous  section   • Placenta previa in third trimester   • Oligohydramnios        Objective    Vitals  Temp:  Temp:  [98.3 °F (36.8 °C)-98.7 °F (37.1 °C)] 98.7 °F (37.1 °C)  Temp src: Oral  BP:  BP: (112-129)/(57-72) 129/71  Pulse:  Heart Rate:  [66-78] 66  RR:   Resp:  [16-18] 16    General Awake, alert, no distress  Abdomen Soft, non-distended, fundus firm, below umbilicus, appropriately tender  Incision  Drsg Intact, no erythema or exudate  Extremities Calves NT bilaterally     I/O last 3 completed shifts:  In: -   Out: 3600 [Urine:3600]    LABS:   Lab Results   Component Value Date    WBC 14.82 (H) 2018    HGB 10.0 (L) 2018    HCT 30.8 (L) 2018    MCV 95.1 2018     2018       Infant: male , circ done      Assessment   1.  POD 2    Plan: Doing well.  Routine postoperative care. Remove drsg.       Active Problems:   None      RHEA Dunlap  2018 9:19 AM

## 2018-09-09 VITALS
OXYGEN SATURATION: 98 % | SYSTOLIC BLOOD PRESSURE: 138 MMHG | DIASTOLIC BLOOD PRESSURE: 81 MMHG | HEART RATE: 71 BPM | BODY MASS INDEX: 29.06 KG/M2 | TEMPERATURE: 98.4 F | RESPIRATION RATE: 18 BRPM | WEIGHT: 164 LBS | HEIGHT: 63 IN

## 2018-09-09 RX ORDER — IBUPROFEN 600 MG/1
600 TABLET ORAL EVERY 6 HOURS PRN
Qty: 30 TABLET | Refills: 0 | Status: SHIPPED | OUTPATIENT
Start: 2018-09-09 | End: 2018-11-01

## 2018-09-09 RX ORDER — OXYCODONE HYDROCHLORIDE AND ACETAMINOPHEN 5; 325 MG/1; MG/1
1 TABLET ORAL EVERY 4 HOURS PRN
Qty: 20 TABLET | Refills: 0 | Status: SHIPPED | OUTPATIENT
Start: 2018-09-09 | End: 2018-11-01

## 2018-09-09 RX ADMIN — IBUPROFEN 600 MG: 600 TABLET ORAL at 00:04

## 2018-09-09 RX ADMIN — DOCUSATE SODIUM 100 MG: 100 CAPSULE, LIQUID FILLED ORAL at 09:55

## 2018-09-09 RX ADMIN — OXYCODONE HYDROCHLORIDE AND ACETAMINOPHEN 1 TABLET: 5; 325 TABLET ORAL at 02:19

## 2018-09-09 RX ADMIN — OXYCODONE HYDROCHLORIDE AND ACETAMINOPHEN 1 TABLET: 5; 325 TABLET ORAL at 06:12

## 2018-09-09 RX ADMIN — OXYCODONE HYDROCHLORIDE AND ACETAMINOPHEN 1 TABLET: 5; 325 TABLET ORAL at 09:54

## 2018-09-09 RX ADMIN — SIMETHICONE CHEW TAB 80 MG 80 MG: 80 TABLET ORAL at 12:55

## 2018-09-09 RX ADMIN — IBUPROFEN 600 MG: 600 TABLET ORAL at 09:55

## 2018-09-09 RX ADMIN — OXYCODONE HYDROCHLORIDE AND ACETAMINOPHEN 1 TABLET: 5; 325 TABLET ORAL at 12:55

## 2018-09-09 RX ADMIN — PRENATAL VIT W/ FE FUMARATE-FA TAB 27-0.8 MG 1 TABLET: 27-0.8 TAB at 09:54

## 2018-09-09 RX ADMIN — SIMETHICONE CHEW TAB 80 MG 80 MG: 80 TABLET ORAL at 09:54

## 2018-09-09 NOTE — DISCHARGE SUMMARY
Discharge Summary    Date of Admission: 2018  Date of Discharge:  2018      Patient: Hali Dennis      MR#:[id number]    Primary Surgeon/OB: Lauren Jane MD    Discharge Surgeon/OB:    Presenting Problem/History of Present Illness  Oligohydramnios [O41.00X0]     Patient Active Problem List   Diagnosis   • Fetal arrhythmia affecting pregnancy, antepartum   •  delivery delivered   • Currently pregnant   • Breech presentation   • Anemia   • Previous  section   • Placenta previa in third trimester   • Oligohydramnios         Discharge Diagnosis:  section at 35w4d    Procedures:  , Low Transverse     2018    10:06 AM      Feeding method: Breastfeeding Status: Unknown    Rh Immune globulin given: not applicable    Rubella vaccine given: not applicable    Discharge Date: 2018; Discharge Time: 11:06 AM    Early Discharge:  NO    Hospital Course  Patient is a 37 y.o. female  at 35w4d status post  section with uneventful postoperative recovery.  Patient was advanced to regular diet on postoperative day#1.  On discharge, ambulating, tolerating a regular diet without any difficulties and her incision is dry, clean and intact.     Infant:   male  fetus 2398 g (5 lb 4.6 oz)  with Apgar scores of 8  , 9   at five minutes.    Circumcision: yes    Condition on Discharge:  Stable    Vital Signs  Temp:  [98.4 °F (36.9 °C)-98.6 °F (37 °C)] 98.4 °F (36.9 °C)  Heart Rate:  [65-72] 71  Resp:  [16-18] 18  BP: (110-138)/(59-81) 138/81    Lab Results   Component Value Date    WBC 14.82 (H) 2018    HGB 10.0 (L) 2018    HCT 30.8 (L) 2018    MCV 95.1 2018     2018       Discharge Disposition  Home or Self Care    Discharge Medications     Discharge Medications      New Medications      Instructions Start Date   ibuprofen 600 MG tablet  Commonly known as:  ADVIL,MOTRIN   600 mg, Oral, Every 6 Hours PRN      oxyCODONE-acetaminophen 5-325  MG per tablet  Commonly known as:  PERCOCET   1 tablet, Oral, Every 4 Hours PRN         Continue These Medications      Instructions Start Date   docusate sodium 100 MG capsule   100 mg, Oral, 2 Times Daily      Prenatal 27-1 27-1 MG tablet tablet   1 tablet, Oral, Daily         Stop These Medications    acetaminophen 325 MG tablet  Commonly known as:  TYLENOL     ferrous sulfate 325 (65 FE) MG tablet     NIFEdipine 10 MG capsule  Commonly known as:  PROCARDIA            Discharge Diet:     Activity at Discharge:     Follow-up Appointments  Future Appointments  Date Time Provider Department Center   9/30/2018 2:00 PM PAT 1 ROQUE Tucker MD  09/09/18  11:05 AM

## 2018-09-09 NOTE — PLAN OF CARE
Problem: Patient Care Overview  Goal: Plan of Care Review  Outcome: Outcome(s) achieved Date Met: 18    Goal: Individualization and Mutuality  Outcome: Outcome(s) achieved Date Met: 18    Goal: Discharge Needs Assessment  Outcome: Outcome(s) achieved Date Met: 18      Problem: Prenatal Patient, Hospitalized (Adult,Obstetrics,Pediatric)  Goal: Signs and Symptoms of Listed Potential Problems Will be Absent, Minimized or Managed (Prenatal Patient, Hospitalized)  Outcome: Outcome(s) achieved Date Met: 18      Problem: Breastfeeding (Adult,Obstetrics,Pediatric)  Goal: Signs and Symptoms of Listed Potential Problems Will be Absent, Minimized or Managed (Breastfeeding)  Outcome: Outcome(s) achieved Date Met: 18      Problem: Postpartum ( Delivery) (Adult,Obstetrics,Pediatric)  Goal: Signs and Symptoms of Listed Potential Problems Will be Absent, Minimized or Managed (Postpartum)  Outcome: Outcome(s) achieved Date Met: 18    Goal: Anesthesia/Sedation Recovery  Outcome: Outcome(s) achieved Date Met: 18

## 2018-09-10 LAB
CYTO UR: NORMAL
LAB AP CASE REPORT: NORMAL
LAB AP CLINICAL INFORMATION: NORMAL
PATH REPORT.FINAL DX SPEC: NORMAL
PATH REPORT.GROSS SPEC: NORMAL

## 2018-09-10 NOTE — PAYOR COMM NOTE
"Hali Dennis (37 y.o. Female)     Date of Birth Social Security Number Address Home Phone MRN    1980  [address] [id number] [phone] [id number]    Rastafari Marital Status          None        Admission Date Admission Type Admitting Provider Attending Provider Department, Room/Bed    18 Elective Lauren Jane MD  [facility]HER [facility]ABY 4B, N433/1    Discharge Date Discharge Disposition Discharge Destination        2018 Home or Self Care              Attending Provider:  (none)   Allergies:  Keflex [Cephalexin]    Isolation:  None   Infection:  None   Code Status:  Prior    Ht:  160 cm (62.99\")   Wt:  74.4 kg (164 lb)    Admission Cmt:  None   Principal Problem:  None                Active Insurance as of 2018     Primary Coverage     Payor Plan Insurance Group Employer/Plan Group    [employer] BLUE [employer]OSS [employer] BLUE CROSS BLUE SHIELD PPO [id number]     Payor Plan Address Payor Plan Phone Number Effective From Effective To    [address] [phone] 3/1/2017     [facility] [id number]       Subscriber Name Subscriber Birth Date Member ID       TALIA DENNIS 1983 [id number]                 Emergency Contacts      (Rel.) Home Phone Work Phone Mobile Phone    Walt Dennis (Spouse) [phone] -- --                 Discharge Summary      Tolu Tucker MD at 2018 11:05 AM          Discharge Summary    Date of Admission: 2018  Date of Discharge:  2018      Patient: Hali Dennis      MR#:[id number]    Primary Surgeon/OB: Lauren Jane MD    Discharge Surgeon/OB:    Presenting Problem/History of Present Illness  Oligohydramnios [O41.00X0]     Patient Active Problem List   Diagnosis   • Fetal arrhythmia affecting pregnancy, antepartum   •  delivery delivered   • Currently pregnant   • Breech presentation   • Anemia   • Previous  section   • Placenta previa in third trimester   • " Oligohydramnios         Discharge Diagnosis:  section at 35w4d    Procedures:  , Low Transverse     2018    10:06 AM      Feeding method: Breastfeeding Status: Unknown    Rh Immune globulin given: not applicable    Rubella vaccine given: not applicable    Discharge Date: 2018; Discharge Time: 11:06 AM    Early Discharge:  NO    Hospital Course  Patient is a 37 y.o. female  at 35w4d status post  section with uneventful postoperative recovery.  Patient was advanced to regular diet on postoperative day#1.  On discharge, ambulating, tolerating a regular diet without any difficulties and her incision is dry, clean and intact.     Infant:   male  fetus 2398 g (5 lb 4.6 oz)  with Apgar scores of 8  , 9   at five minutes.    Circumcision: yes    Condition on Discharge:  Stable    Vital Signs  Temp:  [98.4 °F (36.9 °C)-98.6 °F (37 °C)] 98.4 °F (36.9 °C)  Heart Rate:  [65-72] 71  Resp:  [16-18] 18  BP: (110-138)/(59-81) 138/81    Lab Results   Component Value Date    WBC 14.82 (H) 2018    HGB 10.0 (L) 2018    HCT 30.8 (L) 2018    MCV 95.1 2018     2018       Discharge Disposition  Home or Self Care    Discharge Medications     Discharge Medications      New Medications      Instructions Start Date   ibuprofen 600 MG tablet  Commonly known as:  ADVIL,MOTRIN   600 mg, Oral, Every 6 Hours PRN      oxyCODONE-acetaminophen 5-325 MG per tablet  Commonly known as:  PERCOCET   1 tablet, Oral, Every 4 Hours PRN         Continue These Medications      Instructions Start Date   docusate sodium 100 MG capsule   100 mg, Oral, 2 Times Daily      Prenatal 27-1 27-1 MG tablet tablet   1 tablet, Oral, Daily         Stop These Medications    acetaminophen 325 MG tablet  Commonly known as:  TYLENOL     ferrous sulfate 325 (65 FE) MG tablet     NIFEdipine 10 MG capsule  Commonly known as:  PROCARDIA            Discharge Diet:     Activity at Discharge:     Follow-up  Appointments  Future Appointments  Date Time Provider Department Center   9/30/2018 2:00 PM PAT 1 ROQUE BH ROQUE PAT ROQUE         Tolu Tucker MD  09/09/18  11:05 AM        Electronically signed by Tolu Tucker MD at 9/9/2018 11:06 AM       Discharge Order     Start     Ordered    09/09/18 1105  Discharge patient  Once     Expected Discharge Date:  09/09/18    Expected Discharge Time:  Midday    Discharge Disposition:  Home or Self Care    Physician of Record for Attribution - Please select from Treatment Team:  KIMBERLY HIGHTOWER [[id number]]    Review needed by CMO to determine Physician of Record:  No       Question Answer Comment   Physician of Record for Attribution - Please select from Treatment Team KIMBERLY HIGHTOWER    Review needed by CMO to determine Physician of Record No        09/09/18 1102

## 2018-09-17 ENCOUNTER — HOSPITAL ENCOUNTER (OUTPATIENT)
Dept: LACTATION | Facility: HOSPITAL | Age: 38
Discharge: HOME | End: 2018-09-17

## 2018-09-17 NOTE — LACTATION NOTE
09/17/18 1030   Maternal Information   Date of Referral 09/17/18   Person Making Referral patient;physician   Maternal Reason for Referral breastfeeding currently   Infant Reason for Referral 35-37 weeks gestation;weight gain inadequate   Maternal Assessment   Breast Size Issue none   Breast Shape Bilateral:;round   Breast Density Bilateral:;full   Nipples Bilateral:;everted   Maternal Infant Feeding   Maternal Emotional State independent;relaxed   Infant Positioning clutch/football;cross-cradle   Signs of Milk Transfer audible swallow   Pain with Feeding no   Comfort Measures Before/During Feeding latch adjusted   Comfort Measures Following Feeding air-drying encouraged;expressed milk applied   Nipple Shape After Feeding, Left Breast symmetrical   Nipple Shape After Feeding, Right symmetrical   Latch Assistance yes   Feeding Infant   Feeding Readiness Cues quiet;rooting   Satiety Cues calm after feeding;infant releases breast   Feeding Tolerance/Success arousal required;coordinated suck;coordinated swallow;sleepy   Feeding Physical Stress Cues fatigues quickly   Effective Latch During Feeding yes   Suck/Swallow Coordination present   Skin-to-Skin Completed yes   Prefeeding Weight (gm) 2051515 g (15658.3 oz)   Postfeeding Weight (gm) 3579855 g (40757 oz)   Weight Gain/Loss (gm)  55022 g (1763.7 oz)   Breastfeeding Session   Breastfeeding breastfeeding, bilateral   Breastfeeding Time, Left (min) 10   Breastfeeding Time, Right (min) 12   LATCH Score   Latch 2-->grasps breast, tongue down, lips flanged, rhythmic sucking   Audible Swallowing 1-->a few with stimulation   Type of Nipple 2-->everted (after stimulation)   Comfort (Breast/Nipple) 2-->soft/nontender   Hold (Positioning) 1-->minimal assist, teach one side, mother does other, staff holds   Score 8   Equipment Type   Breast Pump Type double electric, personal   Breast Pump Flange Type hard   Breast Pump Flange Size 24 mm   Breast Pumping   Breast Pumping  Interventions post-feed pumping encouraged   Breast Pumping bilateral breasts pumped until soft  (Breastfeed every other feeding/bottle feed every other feed.)

## 2018-09-30 ENCOUNTER — APPOINTMENT (OUTPATIENT)
Dept: PREADMISSION TESTING | Facility: HOSPITAL | Age: 38
End: 2018-09-30

## 2019-09-27 ENCOUNTER — TELEPHONE (OUTPATIENT)
Dept: URGENT CARE | Facility: CLINIC | Age: 39
End: 2019-09-27

## 2019-09-27 NOTE — TELEPHONE ENCOUNTER
Spoke to patient to see if she was feeling better and if she had brought stool back or going to bring stool back. Patient states she was feeling better and diarrhea has subsided.

## 2020-10-16 ENCOUNTER — OFFICE VISIT (OUTPATIENT)
Dept: OBSTETRICS AND GYNECOLOGY | Facility: CLINIC | Age: 40
End: 2020-10-16

## 2020-10-16 DIAGNOSIS — B37.31 YEAST INFECTION INVOLVING THE VAGINA AND SURROUNDING AREA: Primary | ICD-10-CM

## 2020-10-16 DIAGNOSIS — N89.8 VAGINAL ITCHING: Primary | ICD-10-CM

## 2020-10-16 LAB
KOH PREP NAIL: ABNORMAL
WET PREP GENITAL: NORMAL

## 2020-10-16 PROCEDURE — 87210 SMEAR WET MOUNT SALINE/INK: CPT | Performed by: NURSE PRACTITIONER

## 2020-10-16 PROCEDURE — 99213 OFFICE O/P EST LOW 20 MIN: CPT | Performed by: NURSE PRACTITIONER

## 2020-10-16 PROCEDURE — 87220 TISSUE EXAM FOR FUNGI: CPT | Performed by: NURSE PRACTITIONER

## 2020-10-16 RX ORDER — POLYETHYLENE GLYCOL 3350, SODIUM SULFATE, SODIUM CHLORIDE, POTASSIUM CHLORIDE, ASCORBIC ACID, SODIUM ASCORBATE 140-9-5.2G
KIT ORAL
COMMUNITY
Start: 2020-08-23 | End: 2021-06-04

## 2020-10-16 RX ORDER — FLUCONAZOLE 150 MG/1
150 TABLET ORAL
Qty: 2 TABLET | Refills: 0 | Status: SHIPPED | OUTPATIENT
Start: 2020-10-16 | End: 2020-10-28

## 2020-10-16 RX ORDER — BUSPIRONE HYDROCHLORIDE 5 MG/1
TABLET ORAL
COMMUNITY
Start: 2020-07-29 | End: 2021-12-05

## 2020-10-16 NOTE — PROGRESS NOTES
Chief Complaint   Patient presents with   • Follow-up     GYN       Subjective   HPI  Hali Dennis is a 39 y.o. female, , who presents for itching and burning inside of vagina and bilateral breast soreness for a few days.  Reports that she has a clear discharge that is odorless.    She states she has experienced this problem for 6 weeks.  She describes the severity as moderate.  She states that the problem is concerning.  The patient reports additional symptoms as none.  She had positive ureaplasma and her and her  were treated, symptoms resolved and then have returned. She is seeing urology for microscopic hematuria.    Her last LMP was No LMP recorded. Patient has had an implant.  Partner Status: Marital Status: .  New Partners since last visit: no.  Desires STD Screening: no.    Additional OB/GYN History   Current contraception: contraceptive methods: implant mirena  Desires to: continue contraception  Last Pap : 2020-negative  Last Completed Pap Smear       Status Date      PAP SMEAR No completions recorded        History of abnormal Pap smear: no  Last mammogram:   Last Completed Mammogram     Patient has no health maintenance due at this time        Tobacco Usage?: No   OB History        3    Para   2    Term   1       1    AB   1    Living   2       SAB   0    TAB   0    Ectopic   0    Molar        Multiple   0    Live Births   2                Health Maintenance   Topic Date Due   • Annual Gynecologic Pelvic and Breast Exam  1980   • ANNUAL PHYSICAL  1984   • TDAP/TD VACCINES (1 - Tdap) 2000   • HEPATITIS C SCREENING  2016   • PAP SMEAR  2016   • INFLUENZA VACCINE  2020   • Hepatitis B  Aged Out   • Pneumococcal Vaccine 0-64  Aged Out       The additional following portions of the patient's history were reviewed and updated as appropriate: allergies, current medications, past family history, past medical history, past social  history, past surgical history and problem list.    Review of Systems   Constitutional: Negative.    Gastrointestinal: Negative.    Genitourinary: Positive for breast pain and vaginal discharge. Negative for difficulty urinating, dyspareunia, dysuria and pelvic pain.        Vaginal itching   All other systems reviewed and are negative.      I have reviewed and agree with the HPI, ROS, and historical information as entered above. Sravani Logan, APRN    Objective   Breastfeeding No     Physical Exam  Vitals signs and nursing note reviewed. Exam conducted with a chaperone present.   Constitutional:       Appearance: Normal appearance.   Genitourinary:     Labia:         Right: No rash, tenderness or lesion.         Left: No rash, tenderness or lesion.       Vagina: Vaginal discharge present. No lesions.      Cervix: No cervical motion tenderness, discharge, lesion or cervical bleeding.      Uterus: Normal. Not enlarged, not fixed and not tender.       Adnexa:         Right: No mass or tenderness.          Left: No mass or tenderness.        Rectum: No external hemorrhoid.      Comments: White/clear discharge. Cannot see IUD strings. Chaperone Present  Neurological:      Mental Status: She is alert.         Assessment/Plan     Assessment     Problem List Items Addressed This Visit     None      Visit Diagnoses     Vaginal itching    -  Primary    Relevant Orders    Candida panel, PCR - Swab, Vagina    Bacterial Vaginosis, SHAMAR - Swab, Vagina    Mycoplasma / Ureaplasma Culture - Swab, Cervix    POC Wet Prep (Completed)    POC KOH Prep (Completed)        KOH with yeast buds    Plan     Treat for yeast. With hx or ureaplasma she desires all cultures be sent to confirm. Treat as indicated based on cultures, we discussed possibility of colonization with ureaplasma. Will call regardless with results.       Sravani Logan, RHEA  10/16/2020

## 2020-10-17 ENCOUNTER — APPOINTMENT (OUTPATIENT)
Dept: CT IMAGING | Facility: HOSPITAL | Age: 40
End: 2020-10-17

## 2020-10-17 ENCOUNTER — HOSPITAL ENCOUNTER (EMERGENCY)
Facility: HOSPITAL | Age: 40
Discharge: HOME | End: 2020-10-17
Attending: EMERGENCY MEDICINE | Admitting: EMERGENCY MEDICINE

## 2020-10-17 VITALS
HEIGHT: 63 IN | DIASTOLIC BLOOD PRESSURE: 84 MMHG | WEIGHT: 135 LBS | HEART RATE: 62 BPM | OXYGEN SATURATION: 98 % | TEMPERATURE: 98.1 F | RESPIRATION RATE: 16 BRPM | BODY MASS INDEX: 23.92 KG/M2 | SYSTOLIC BLOOD PRESSURE: 122 MMHG

## 2020-10-17 DIAGNOSIS — M62.830 BACK SPASM: ICD-10-CM

## 2020-10-17 DIAGNOSIS — N83.201 OVARIAN CYST, RIGHT: ICD-10-CM

## 2020-10-17 DIAGNOSIS — S39.012A BACK STRAIN, INITIAL ENCOUNTER: Primary | ICD-10-CM

## 2020-10-17 LAB
ALBUMIN SERPL-MCNC: 4.9 G/DL (ref 3.5–5.2)
ALBUMIN/GLOB SERPL: 1.8 G/DL
ALP SERPL-CCNC: 57 U/L (ref 39–117)
ALT SERPL W P-5'-P-CCNC: 12 U/L (ref 1–33)
ANION GAP SERPL CALCULATED.3IONS-SCNC: 8 MMOL/L (ref 5–15)
AST SERPL-CCNC: 22 U/L (ref 1–32)
B-HCG UR QL: NEGATIVE
BACTERIA UR QL AUTO: ABNORMAL /HPF
BASOPHILS # BLD AUTO: 0.03 10*3/MM3 (ref 0–0.2)
BASOPHILS NFR BLD AUTO: 0.3 % (ref 0–1.5)
BILIRUB SERPL-MCNC: 0.4 MG/DL (ref 0–1.2)
BILIRUB UR QL STRIP: NEGATIVE
BUN SERPL-MCNC: 13 MG/DL (ref 6–20)
BUN/CREAT SERPL: 21 (ref 7–25)
CALCIUM SPEC-SCNC: 9.5 MG/DL (ref 8.6–10.5)
CHLORIDE SERPL-SCNC: 104 MMOL/L (ref 98–107)
CLARITY UR: CLEAR
CO2 SERPL-SCNC: 27 MMOL/L (ref 22–29)
COLOR UR: YELLOW
CREAT SERPL-MCNC: 0.62 MG/DL (ref 0.57–1)
DEPRECATED RDW RBC AUTO: 41.1 FL (ref 37–54)
EOSINOPHIL # BLD AUTO: 0 10*3/MM3 (ref 0–0.4)
EOSINOPHIL NFR BLD AUTO: 0 % (ref 0.3–6.2)
ERYTHROCYTE [DISTWIDTH] IN BLOOD BY AUTOMATED COUNT: 11.9 % (ref 12.3–15.4)
GFR SERPL CREATININE-BSD FRML MDRD: 108 ML/MIN/1.73
GLOBULIN UR ELPH-MCNC: 2.8 GM/DL
GLUCOSE SERPL-MCNC: 78 MG/DL (ref 65–99)
GLUCOSE UR STRIP-MCNC: NEGATIVE MG/DL
HCT VFR BLD AUTO: 40.7 % (ref 34–46.6)
HGB BLD-MCNC: 13.7 G/DL (ref 12–15.9)
HGB UR QL STRIP.AUTO: ABNORMAL
HOLD SPECIMEN: NORMAL
HOLD SPECIMEN: NORMAL
HYALINE CASTS UR QL AUTO: ABNORMAL /LPF
IMM GRANULOCYTES # BLD AUTO: 0.06 10*3/MM3 (ref 0–0.05)
IMM GRANULOCYTES NFR BLD AUTO: 0.5 % (ref 0–0.5)
INTERNAL NEGATIVE CONTROL: NEGATIVE
INTERNAL POSITIVE CONTROL: NORMAL
KETONES UR QL STRIP: NEGATIVE
LEUKOCYTE ESTERASE UR QL STRIP.AUTO: NEGATIVE
LIPASE SERPL-CCNC: 33 U/L (ref 13–60)
LYMPHOCYTES # BLD AUTO: 1.76 10*3/MM3 (ref 0.7–3.1)
LYMPHOCYTES NFR BLD AUTO: 15.9 % (ref 19.6–45.3)
Lab: NORMAL
MCH RBC QN AUTO: 31.6 PG (ref 26.6–33)
MCHC RBC AUTO-ENTMCNC: 33.7 G/DL (ref 31.5–35.7)
MCV RBC AUTO: 94 FL (ref 79–97)
MONOCYTES # BLD AUTO: 0.61 10*3/MM3 (ref 0.1–0.9)
MONOCYTES NFR BLD AUTO: 5.5 % (ref 5–12)
NEUTROPHILS NFR BLD AUTO: 77.8 % (ref 42.7–76)
NEUTROPHILS NFR BLD AUTO: 8.61 10*3/MM3 (ref 1.7–7)
NITRITE UR QL STRIP: NEGATIVE
NRBC BLD AUTO-RTO: 0 /100 WBC (ref 0–0.2)
PH UR STRIP.AUTO: 6 [PH] (ref 5–8)
PLATELET # BLD AUTO: 227 10*3/MM3 (ref 140–450)
PMV BLD AUTO: 9.9 FL (ref 6–12)
POTASSIUM SERPL-SCNC: 4.1 MMOL/L (ref 3.5–5.2)
PROT SERPL-MCNC: 7.7 G/DL (ref 6–8.5)
PROT UR QL STRIP: NEGATIVE
RBC # BLD AUTO: 4.33 10*6/MM3 (ref 3.77–5.28)
RBC # UR: ABNORMAL /HPF
REF LAB TEST METHOD: ABNORMAL
SODIUM SERPL-SCNC: 139 MMOL/L (ref 136–145)
SP GR UR STRIP: 1.01 (ref 1–1.03)
SQUAMOUS #/AREA URNS HPF: ABNORMAL /HPF
UROBILINOGEN UR QL STRIP: ABNORMAL
WBC # BLD AUTO: 11.07 10*3/MM3 (ref 3.4–10.8)
WBC UR QL AUTO: ABNORMAL /HPF
WHOLE BLOOD HOLD SPECIMEN: NORMAL
WHOLE BLOOD HOLD SPECIMEN: NORMAL

## 2020-10-17 PROCEDURE — 71275 CT ANGIOGRAPHY CHEST: CPT

## 2020-10-17 PROCEDURE — 25010000002 KETOROLAC TROMETHAMINE PER 15 MG: Performed by: EMERGENCY MEDICINE

## 2020-10-17 PROCEDURE — 96374 THER/PROPH/DIAG INJ IV PUSH: CPT

## 2020-10-17 PROCEDURE — 99284 EMERGENCY DEPT VISIT MOD MDM: CPT

## 2020-10-17 PROCEDURE — 0 IOPAMIDOL PER 1 ML: Performed by: EMERGENCY MEDICINE

## 2020-10-17 PROCEDURE — 81025 URINE PREGNANCY TEST: CPT | Performed by: EMERGENCY MEDICINE

## 2020-10-17 PROCEDURE — 25010000002 HYDROMORPHONE PER 4 MG: Performed by: EMERGENCY MEDICINE

## 2020-10-17 PROCEDURE — 74177 CT ABD & PELVIS W/CONTRAST: CPT

## 2020-10-17 PROCEDURE — 80053 COMPREHEN METABOLIC PANEL: CPT | Performed by: EMERGENCY MEDICINE

## 2020-10-17 PROCEDURE — 85025 COMPLETE CBC W/AUTO DIFF WBC: CPT | Performed by: EMERGENCY MEDICINE

## 2020-10-17 PROCEDURE — 25010000002 DIAZEPAM PER 5 MG: Performed by: EMERGENCY MEDICINE

## 2020-10-17 PROCEDURE — 81001 URINALYSIS AUTO W/SCOPE: CPT | Performed by: EMERGENCY MEDICINE

## 2020-10-17 PROCEDURE — 96375 TX/PRO/DX INJ NEW DRUG ADDON: CPT

## 2020-10-17 PROCEDURE — 83690 ASSAY OF LIPASE: CPT | Performed by: EMERGENCY MEDICINE

## 2020-10-17 PROCEDURE — 25010000002 ONDANSETRON PER 1 MG: Performed by: EMERGENCY MEDICINE

## 2020-10-17 RX ORDER — SODIUM CHLORIDE 0.9 % (FLUSH) 0.9 %
10 SYRINGE (ML) INJECTION AS NEEDED
Status: DISCONTINUED | OUTPATIENT
Start: 2020-10-17 | End: 2020-10-17 | Stop reason: HOSPADM

## 2020-10-17 RX ORDER — DIAZEPAM 5 MG/ML
5 INJECTION, SOLUTION INTRAMUSCULAR; INTRAVENOUS ONCE
Status: COMPLETED | OUTPATIENT
Start: 2020-10-17 | End: 2020-10-17

## 2020-10-17 RX ORDER — ONDANSETRON 2 MG/ML
4 INJECTION INTRAMUSCULAR; INTRAVENOUS ONCE
Status: COMPLETED | OUTPATIENT
Start: 2020-10-17 | End: 2020-10-17

## 2020-10-17 RX ORDER — CYCLOBENZAPRINE HCL 10 MG
10 TABLET ORAL 3 TIMES DAILY PRN
Qty: 15 TABLET | Refills: 0 | Status: SHIPPED | OUTPATIENT
Start: 2020-10-17 | End: 2021-06-04

## 2020-10-17 RX ORDER — KETOROLAC TROMETHAMINE 15 MG/ML
15 INJECTION, SOLUTION INTRAMUSCULAR; INTRAVENOUS ONCE
Status: COMPLETED | OUTPATIENT
Start: 2020-10-17 | End: 2020-10-17

## 2020-10-17 RX ORDER — SODIUM CHLORIDE 0.9 % (FLUSH) 0.9 %
10 SYRINGE (ML) INJECTION AS NEEDED
Status: DISCONTINUED | OUTPATIENT
Start: 2020-10-17 | End: 2020-10-17

## 2020-10-17 RX ORDER — PREDNISONE 20 MG/1
40 TABLET ORAL DAILY
Qty: 8 TABLET | Refills: 0 | Status: SHIPPED | OUTPATIENT
Start: 2020-10-17 | End: 2021-06-04

## 2020-10-17 RX ORDER — HYDROMORPHONE HYDROCHLORIDE 1 MG/ML
0.5 INJECTION, SOLUTION INTRAMUSCULAR; INTRAVENOUS; SUBCUTANEOUS ONCE
Status: COMPLETED | OUTPATIENT
Start: 2020-10-17 | End: 2020-10-17

## 2020-10-17 RX ADMIN — DIAZEPAM 5 MG: 5 INJECTION, SOLUTION INTRAMUSCULAR; INTRAVENOUS at 14:35

## 2020-10-17 RX ADMIN — KETOROLAC TROMETHAMINE 15 MG: 15 INJECTION, SOLUTION INTRAMUSCULAR; INTRAVENOUS at 14:33

## 2020-10-17 RX ADMIN — IOPAMIDOL 100 ML: 755 INJECTION, SOLUTION INTRAVENOUS at 14:51

## 2020-10-17 RX ADMIN — ONDANSETRON 4 MG: 2 INJECTION INTRAMUSCULAR; INTRAVENOUS at 16:09

## 2020-10-17 RX ADMIN — HYDROMORPHONE HYDROCHLORIDE 0.5 MG: 1 INJECTION, SOLUTION INTRAMUSCULAR; INTRAVENOUS; SUBCUTANEOUS at 16:10

## 2020-10-17 RX ADMIN — SODIUM CHLORIDE 1000 ML: 9 INJECTION, SOLUTION INTRAVENOUS at 14:33

## 2020-10-17 NOTE — ED PROVIDER NOTES
Subjective   Patient presents the ER with left posterior back pain that started gradually last night while she was sitting on the couch.  No known trauma however she did take her 3-year-old to the grocery store and lifted him out of the cart.  She thinks maybe that could have started but there was no sudden onset of pain in that area.  Overall she feels like it is more musculoskeletal however she reports she has been told she has had microscopic hematuria in the past and she has seen a urologist for this.  She is awaiting some testing for further evaluation.  She tells me she does hurt to take a deep expiration however taking a deep inspiration does not seem to affect it.  It is worse with movement.  She denies any abdominal pain no nausea vomiting or diarrhea.      Back Pain  Pain location: left posterior mid back.  Quality:  Aching  Radiates to:  Does not radiate  Pain severity:  Moderate  Pain is:  Same all the time  Onset quality:  Gradual  Duration:  1 day  Timing:  Constant  Progression:  Unchanged  Chronicity:  New  Relieved by:  Being still  Worsened by:  Movement and deep breathing  Associated symptoms: no abdominal pain, no bladder incontinence, no bowel incontinence, no chest pain, no dysuria, no fever, no headaches, no leg pain, no paresthesias and no perianal numbness        Review of Systems   Constitutional: Negative for chills, diaphoresis and fever.   HENT: Negative for congestion and sore throat.    Respiratory: Negative for cough, choking, chest tightness, shortness of breath and wheezing.    Cardiovascular: Negative for chest pain and leg swelling.   Gastrointestinal: Negative for abdominal distention, abdominal pain, anal bleeding, blood in stool, bowel incontinence, constipation, diarrhea, nausea and vomiting.   Genitourinary: Negative for bladder incontinence, difficulty urinating, dysuria, flank pain, frequency, hematuria and urgency.   Musculoskeletal: Positive for back pain.   Neurological:  Negative for headaches and paresthesias.   All other systems reviewed and are negative.      Past Medical History:   Diagnosis Date   •      ELECTIVE   • Anemia    • Encounter for insertion of mirena IUD 10/2018   • Gestational diabetes    • Irregular menses     Q 6-7 WEEKS   • Menorrhagia 2009    PERIODS WERE BETTER WHEN ON THE PILLS AND SHE WANTS TO START THEM   • Papanicolaou smear 2020    NEGATIVE   • Screening breast examination     SELF;ADMITS   • Wears glasses        Allergies   Allergen Reactions   • Keflex [Cephalexin] Rash       Past Surgical History:   Procedure Laterality Date   •  SECTION N/A 2018    Procedure:  SECTION REPEAT * 39 WKS *;  Surgeon: Lauren Jane MD;  Location: [facility] LABOR DELIVERY;  Service: Obstetrics/Gynecology   • INDUCED       EAB - CONFIDENTIAL   • SKIN LESION EXCISION      BENIGN;CHEST;ATYPICAL NEVUS;ONE MODERATE   • SKIN TAG REMOVAL      LABIA;BIOPSY   • TIBIA FRACTURE SURGERY Right     rods in place   • WISDOM TOOTH EXTRACTION         Family History   Problem Relation Age of Onset   • Cancer Father    • Liver cancer Father    • Lung cancer Father    • Heart attack Sister    • Heart disease Sister         LARGE HEART   • Drug abuse Sister    • Heart attack Paternal Grandfather    • Heart disease Paternal Grandfather    • Cancer Paternal Grandfather    • Cancer Paternal Grandmother    • Breast cancer Paternal Grandmother    • Diabetes Paternal Grandmother    • Cancer Maternal Grandmother    • Arthritis Maternal Grandmother    • Breast cancer Maternal Grandmother    • Diabetes Maternal Grandfather    • Cancer Maternal Grandfather    • Other Mother         BENIGN BREAST LUMPS REMOVED   • Other Other         DOUBLE MASTECTOMY       Social History     Socioeconomic History   • Marital status:      Spouse name: Not on file   • Number of children: Not on file   • Years of education: Not on file   • Highest education level:  Not on file   Occupational History   • Occupation: [employer] EMPLOYEE     Comment: [employer]   Tobacco Use   • Smoking status: Former Smoker     Types: Cigarettes     Quit date: 3/20/2016     Years since quittin.5   • Smokeless tobacco: Never Used   • Tobacco comment: quit 1 year ago   Substance and Sexual Activity   • Alcohol use: Yes   • Drug use: No   • Sexual activity: Yes     Partners: Male     Birth control/protection: Implant           Objective   Physical Exam  Constitutional:       Appearance: She is well-developed.   HENT:      Head: Normocephalic and atraumatic.      Right Ear: External ear normal.      Left Ear: External ear normal.      Nose: Nose normal.   Eyes:      Conjunctiva/sclera: Conjunctivae normal.      Pupils: Pupils are equal, round, and reactive to light.   Neck:      Musculoskeletal: Normal range of motion and neck supple.   Cardiovascular:      Rate and Rhythm: Normal rate and regular rhythm.      Heart sounds: Normal heart sounds.   Pulmonary:      Effort: Pulmonary effort is normal.      Breath sounds: Normal breath sounds.   Abdominal:      General: Bowel sounds are normal.      Palpations: Abdomen is soft.   Musculoskeletal: Normal range of motion.        Arms:       Comments: Pain easily reproducible below the left scapula with deep palpation and movement.  Patient is very uncomfortable.  She tells me she is having to stand and take shallow breaths as it hurts.   Skin:     General: Skin is warm and dry.   Neurological:      Mental Status: She is alert and oriented to person, place, and time.   Psychiatric:         Behavior: Behavior normal.         Thought Content: Thought content normal.         Judgment: Judgment normal.         Procedures           ED Course  ED Course as of Oct 17 1624   Sat Oct 17, 2020   1401 Broad differential.  Most seemingly likely musculoskeletal given the reproducible nature of it however her lack of deep expirations is concerning as well as  her level discomfort.  We will get some imaging of her chest and flank for further evaluation as well as pain control.    [ANGELI]   1619 I discussed the results of her CAT scan as well as all blood work with the patient.  This sounds more musculoskeletal.  I did go over of the patient with stretching and movement techniques that I think would be beneficial.  Do recommend primary care follow-up this week for further evaluation.  Patient well aware the signs symptoms worse condition.  Will return at once for intractable pain fever bloody cough difficulty breathing etc.  All thankful and agreeable.    [JM]      ED Course User Index  [JM] Buddy Hadley APRN           CT Angiogram Chest   Final Result   No acute findings within the chest, abdomen or pelvis. Aorta is normal   in caliber without acute aortic pathology.       Fluid density 6 cm structure in the right adnexa may represent a cyst or   large follicle. Ultrasound may provide further evaluation.       Sigmoid diverticulosis without diverticulitis.           This report was finalized on 10/17/2020 3:14 PM by Erik Simental.          CT Abdomen Pelvis With Contrast   Final Result   No acute findings within the chest, abdomen or pelvis. Aorta is normal   in caliber without acute aortic pathology.       Fluid density 6 cm structure in the right adnexa may represent a cyst or   large follicle. Ultrasound may provide further evaluation.       Sigmoid diverticulosis without diverticulitis.           This report was finalized on 10/17/2020 3:14 PM by Erik Simental.                                            [facility]    Final diagnoses:   Back strain, initial encounter   Back spasm   Ovarian cyst, right            Buddy Hadley APRN  10/17/20 [id number]

## 2020-10-28 LAB
A VAGINAE DNA VAG QL NAA+PROBE: NORMAL SCORE
BVAB2 DNA VAG QL NAA+PROBE: NORMAL SCORE
C ALBICANS DNA VAG QL NAA+PROBE: NEGATIVE
C GLABRATA DNA VAG QL NAA+PROBE: NEGATIVE
C KRUSEI DNA VAG QL NAA+PROBE: NEGATIVE
C LUSITANIAE DNA VAG QL NAA+PROBE: POSITIVE
CANDIDA DNA VAG QL NAA+PROBE: NEGATIVE
Lab: NORMAL
Lab: NORMAL
M GENITALIUM DNA SPEC QL NAA+PROBE: NEGATIVE
M HOMINIS DNA SPEC QL NAA+PROBE: NEGATIVE
MEGA1 DNA VAG QL NAA+PROBE: NORMAL SCORE
UREAPLASMA DNA SPEC QL NAA+PROBE: NEGATIVE

## 2020-10-28 RX ORDER — FLUCONAZOLE 150 MG/1
150 TABLET ORAL AS NEEDED
Qty: 3 TABLET | Refills: 0 | Status: SHIPPED | OUTPATIENT
Start: 2020-10-28 | End: 2020-12-09 | Stop reason: SDUPTHER

## 2020-10-28 NOTE — PROGRESS NOTES
+ yeast, pt notified and will erx diflucan. Also has a 6 cm cyst noted on CT when she was seen for her hematuria. Will schedule F/U for that and breast check (persistent bilateral breast pain) next week.

## 2020-11-02 ENCOUNTER — OFFICE VISIT (OUTPATIENT)
Dept: OBSTETRICS AND GYNECOLOGY | Facility: CLINIC | Age: 40
End: 2020-11-02

## 2020-11-02 VITALS
BODY MASS INDEX: 24.27 KG/M2 | HEIGHT: 63 IN | WEIGHT: 137 LBS | SYSTOLIC BLOOD PRESSURE: 126 MMHG | DIASTOLIC BLOOD PRESSURE: 78 MMHG

## 2020-11-02 DIAGNOSIS — Z80.3 FAMILY HISTORY OF BREAST CANCER: ICD-10-CM

## 2020-11-02 DIAGNOSIS — N83.209 CYST OF OVARY, UNSPECIFIED LATERALITY: Primary | ICD-10-CM

## 2020-11-02 DIAGNOSIS — Z12.31 ENCOUNTER FOR SCREENING MAMMOGRAM FOR MALIGNANT NEOPLASM OF BREAST: ICD-10-CM

## 2020-11-02 PROCEDURE — 99213 OFFICE O/P EST LOW 20 MIN: CPT | Performed by: NURSE PRACTITIONER

## 2020-11-02 NOTE — PROGRESS NOTES
Chief Complaint   Patient presents with   • Follow-up     Ovarian Cyst       Subjective   HPI  Hali Dennis is a 39 y.o. female, , who presents for evaluation of ovarian cyst with ultrasound.      She states she has experienced this problem for an unknown amount of time.  She had a CT scan on her kidneys about 3 weeks ago where a 6 cm cyst was found to be on her ovary.  She describes the severity as mild.  She states that the problem is mild.  The patient reports that she has been having breast pain and tenderness in both breasts that started about 2-3 weeks ago.  It has now resolved.    Her last LMP was Patient's last menstrual period was 2020 (exact date)..  Periods are absent.  Dysmenorrhea:none.  Patient reports problems with: breast tenderness.  Partner Status: Marital Status: .  New Partners since last visit: no.  Desires STD Screening: no.    Additional OB/GYN History   Current contraception: contraceptive methods: IUD.  Insertion date: 10/23/2018  Desires to: continue contraception  Last Pap :   Last Completed Pap Smear       Status Date      PAP SMEAR Done 2020 Negative        History of abnormal Pap smear: no  Last mammogram:   Last Completed Mammogram     Patient has no health maintenance due at this time        Tobacco Usage?: No   OB History        3    Para   2    Term   1       1    AB   1    Living   2       SAB   0    TAB   0    Ectopic   0    Molar        Multiple   0    Live Births   2                Health Maintenance   Topic Date Due   • Annual Gynecologic Pelvic and Breast Exam  1981   • ANNUAL PHYSICAL  1984   • TDAP/TD VACCINES (1 - Tdap) 2000   • HEPATITIS C SCREENING  2016   • INFLUENZA VACCINE  2020   • PAP SMEAR  2023   • Hepatitis B  Aged Out   • Pneumococcal Vaccine 0-64  Aged Out       The additional following portions of the patient's history were reviewed and updated as appropriate: allergies, current  "medications, past family history, past medical history, past social history, past surgical history and problem list.    Review of Systems   Constitutional: Negative.    HENT: Negative.    Eyes: Negative.    Respiratory: Negative.    Cardiovascular: Negative.    Gastrointestinal: Negative.    Endocrine: Negative.    Genitourinary: Positive for breast pain.   Musculoskeletal: Negative.    Skin: Negative.    Allergic/Immunologic: Positive for environmental allergies.   Neurological: Negative.    Hematological: Negative.    Psychiatric/Behavioral: Negative.        I have reviewed and agree with the HPI, ROS, and historical information as entered above. Sravani EDUARDO Logan, APRN    Objective   /78   Ht 160 cm (63\")   Wt 62.1 kg (137 lb)   LMP 05/13/2020 (Exact Date)   Breastfeeding No   BMI 24.27 kg/m²     Physical Exam  Constitutional:       Appearance: Normal appearance.   Pulmonary:      Effort: Pulmonary effort is normal.   Chest:      Breasts:         Right: Normal. No inverted nipple, mass, nipple discharge, skin change or tenderness.         Left: Normal. No inverted nipple, mass, nipple discharge, skin change or tenderness.   Neurological:      Mental Status: She is alert.         Assessment/Plan     Assessment     Problem List Items Addressed This Visit     None      Visit Diagnoses     Cyst of ovary, unspecified laterality    -  Primary    Relevant Orders    US Non-ob Transvaginal    US Non-ob Transvaginal    Encounter for screening mammogram for malignant neoplasm of breast        Family history of breast cancer        Relevant Orders    Mammo Screening Digital Tomosynthesis Bilateral With CAD          1. Bilateral breast tenderness resolved. Family history of breast cancer, MGM age 49.  2. 3.5 cm right ovarian cyst, anechoic. U/S otherwise normal    Plan     Return in about 6 weeks (around 12/14/2020) for U/S f/u cyst.  1. Proceed with screening mammogram at age 40, encouraged monthly " SBE.      Sravani Logan, APRN  11/02/2020

## 2020-12-09 ENCOUNTER — OFFICE VISIT (OUTPATIENT)
Dept: OBSTETRICS AND GYNECOLOGY | Facility: CLINIC | Age: 40
End: 2020-12-09

## 2020-12-09 VITALS
BODY MASS INDEX: 24.64 KG/M2 | WEIGHT: 139.1 LBS | SYSTOLIC BLOOD PRESSURE: 116 MMHG | HEIGHT: 63 IN | DIASTOLIC BLOOD PRESSURE: 68 MMHG

## 2020-12-09 DIAGNOSIS — R31.9 HEMATURIA, UNSPECIFIED TYPE: ICD-10-CM

## 2020-12-09 DIAGNOSIS — N89.8 VAGINAL ITCHING: Primary | ICD-10-CM

## 2020-12-09 LAB
BILIRUB BLD-MCNC: NEGATIVE MG/DL
GLUCOSE UR STRIP-MCNC: NEGATIVE MG/DL
KETONES UR QL: NEGATIVE
KOH PREP NAIL: NORMAL
LEUKOCYTE EST, POC: NEGATIVE
NITRITE UR-MCNC: NEGATIVE MG/ML
PH UR: 7 [PH] (ref 5–8)
PROT UR STRIP-MCNC: NEGATIVE MG/DL
RBC # UR STRIP: ABNORMAL /UL
SP GR UR: 1.02 (ref 1–1.03)
UROBILINOGEN UR QL: NORMAL
WET PREP GENITAL: NORMAL

## 2020-12-09 PROCEDURE — 87220 TISSUE EXAM FOR FUNGI: CPT | Performed by: NURSE PRACTITIONER

## 2020-12-09 PROCEDURE — 81002 URINALYSIS NONAUTO W/O SCOPE: CPT | Performed by: NURSE PRACTITIONER

## 2020-12-09 PROCEDURE — 87210 SMEAR WET MOUNT SALINE/INK: CPT | Performed by: NURSE PRACTITIONER

## 2020-12-09 PROCEDURE — 99213 OFFICE O/P EST LOW 20 MIN: CPT | Performed by: NURSE PRACTITIONER

## 2020-12-09 RX ORDER — FLUCONAZOLE 150 MG/1
150 TABLET ORAL AS NEEDED
Qty: 3 TABLET | Refills: 0 | Status: SHIPPED | OUTPATIENT
Start: 2020-12-09 | End: 2021-06-04

## 2020-12-09 NOTE — PROGRESS NOTES
"Chief Complaint   Patient presents with   • Vaginitis         Subjective   HPI  Hali Dennis is a 39 y.o. female, , who presents with evaluation of vaginal itching and irritation that is recurrent.      She reports that she started to have symptoms over the weekend. She denies dysuria, urinary frequency or urgency, abnormal vaginal discharge or vaginal odor.     She was seen back in October for the same issue. She has a hx of ureaplasma which cleared with treatment and + candida lustinae on culture.     Her last LMP was No LMP recorded. Patient has had an implant..  Periods are absent, secondary to IUD, lasting 0 days.  Partner Status: Marital Status: .  New Partners since last visit: no.  Desires STD Screening: no.    The additional following portions of the patient's history were reviewed and updated as appropriate: allergies, current medications, past family history, past medical history, past social history, past surgical history and problem list.    Review of Systems   Constitutional: Negative.    Gastrointestinal: Negative.    Genitourinary: Positive for vaginal discharge. Negative for dysuria, menstrual problem, pelvic pain, pelvic pressure and urgency.        Vaginal itching   Skin: Negative.    Psychiatric/Behavioral: Negative.      All other systems reviewed and are negative.     I have reviewed and agree with the HPI, ROS, and historical information as entered above. Sravani Logan, APRN    Objective   /68   Ht 160 cm (63\")   Wt 63.1 kg (139 lb 1.6 oz)   Breastfeeding No Comment: Mirena  BMI 24.64 kg/m²     Physical Exam  Vitals signs and nursing note reviewed. Exam conducted with a chaperone present.   Constitutional:       Appearance: Normal appearance.   Pulmonary:      Effort: Pulmonary effort is normal.   Genitourinary:     Labia:         Right: No rash, tenderness or lesion.         Left: No rash, tenderness or lesion.       Vagina: Vaginal discharge present. No " lesions.      Cervix: No cervical motion tenderness, discharge, lesion or cervical bleeding.      Uterus: Normal. Not enlarged, not fixed and not tender.       Adnexa:         Right: No mass or tenderness.          Left: No mass or tenderness.        Rectum: No external hemorrhoid.      Comments: Scant yellow/white dishcarge noted. IUD string palpable. Chaperone Present  Neurological:      Mental Status: She is alert.         Wet mount performed? Yes- possibly 1 yeast bud but not definitive    Assessment/Plan     Assessment and Plan    Problem List Items Addressed This Visit     None      Visit Diagnoses     Vaginal itching    -  Primary    Relevant Orders    Candida panel, PCR - Swab, Vagina    Bacterial Vaginosis, SHAMAR - Swab, Cervix        Itching is currently near urethra and improved since this weekend when she began sleeping in cotton pajamas with no underwear. She has a hx of ureaplasma with GRISEL that was negative following treatment. She had + candida lustinae 11/2020 and was treated with diflucan and followed with terazol 7 which resulted in relief followed by recurrence 2 weeks later. She eats yogurt with probiotics often and is following all vulvovaginal hygiene recommendations, watching sugar, and moisture. She had neg HIV with her last child in 2018 and normal glucose in ER 2 months ago.   CCUA with blood only- had negative urology workup for that    1. NuSwab ordered  2. Medication(s) ordered  3. Counseling on Vaginitis provided   4. Nuswab BV/yeast sent. Will try diflucan again in the interim. If positive for yeast then we will do weekly diflucan x 4 months. If negative may need further eval if symptoms persist. Advised hydrocortisone and aquaphor prn to external area that is irritated.        Sravani Logan, APRN  12/09/2020

## 2020-12-12 LAB
A VAGINAE DNA VAG QL NAA+PROBE: NORMAL SCORE
BVAB2 DNA VAG QL NAA+PROBE: NORMAL SCORE
C ALBICANS DNA VAG QL NAA+PROBE: NEGATIVE
C GLABRATA DNA VAG QL NAA+PROBE: NEGATIVE
C KRUSEI DNA VAG QL NAA+PROBE: NEGATIVE
C LUSITANIAE DNA VAG QL NAA+PROBE: NEGATIVE
CANDIDA DNA VAG QL NAA+PROBE: NEGATIVE
MEGA1 DNA VAG QL NAA+PROBE: NORMAL SCORE

## 2021-01-05 ENCOUNTER — HOSPITAL ENCOUNTER (OUTPATIENT)
Dept: MAMMOGRAPHY | Facility: HOSPITAL | Age: 41
End: 2021-01-05

## 2021-02-12 ENCOUNTER — APPOINTMENT (OUTPATIENT)
Dept: MAMMOGRAPHY | Facility: HOSPITAL | Age: 41
End: 2021-02-12

## 2021-03-24 ENCOUNTER — HOSPITAL ENCOUNTER (OUTPATIENT)
Dept: MAMMOGRAPHY | Facility: HOSPITAL | Age: 41
Discharge: HOME | End: 2021-03-24
Admitting: NURSE PRACTITIONER

## 2021-03-24 DIAGNOSIS — Z80.3 FAMILY HISTORY OF BREAST CANCER: ICD-10-CM

## 2021-03-24 PROCEDURE — 77067 SCR MAMMO BI INCL CAD: CPT | Performed by: RADIOLOGY

## 2021-03-24 PROCEDURE — 77063 BREAST TOMOSYNTHESIS BI: CPT | Performed by: RADIOLOGY

## 2021-03-24 PROCEDURE — 77063 BREAST TOMOSYNTHESIS BI: CPT

## 2021-03-24 PROCEDURE — 77067 SCR MAMMO BI INCL CAD: CPT

## 2021-04-02 ENCOUNTER — HOSPITAL ENCOUNTER (OUTPATIENT)
Dept: MAMMOGRAPHY | Facility: HOSPITAL | Age: 41
Discharge: HOME | End: 2021-04-02
Admitting: RADIOLOGY

## 2021-04-02 DIAGNOSIS — R92.8 ABNORMAL MAMMOGRAM: ICD-10-CM

## 2021-04-02 PROCEDURE — 77065 DX MAMMO INCL CAD UNI: CPT

## 2021-04-02 PROCEDURE — 77061 BREAST TOMOSYNTHESIS UNI: CPT | Performed by: RADIOLOGY

## 2021-04-02 PROCEDURE — G0279 TOMOSYNTHESIS, MAMMO: HCPCS

## 2021-04-02 PROCEDURE — 77065 DX MAMMO INCL CAD UNI: CPT | Performed by: RADIOLOGY

## 2021-04-09 ENCOUNTER — APPOINTMENT (OUTPATIENT)
Dept: MAMMOGRAPHY | Facility: HOSPITAL | Age: 41
End: 2021-04-09

## 2021-06-04 ENCOUNTER — OFFICE VISIT (OUTPATIENT)
Dept: OBSTETRICS AND GYNECOLOGY | Facility: CLINIC | Age: 41
End: 2021-06-04

## 2021-06-04 VITALS
DIASTOLIC BLOOD PRESSURE: 68 MMHG | BODY MASS INDEX: 24.98 KG/M2 | WEIGHT: 141 LBS | SYSTOLIC BLOOD PRESSURE: 100 MMHG | HEIGHT: 63 IN

## 2021-06-04 DIAGNOSIS — Z01.419 WOMEN'S ANNUAL ROUTINE GYNECOLOGICAL EXAMINATION: Primary | ICD-10-CM

## 2021-06-04 DIAGNOSIS — Z97.5 IUD (INTRAUTERINE DEVICE) IN PLACE: ICD-10-CM

## 2021-06-04 PROCEDURE — 99395 PREV VISIT EST AGE 18-39: CPT | Performed by: NURSE PRACTITIONER

## 2021-06-04 NOTE — PROGRESS NOTES
GYN Annual Exam     CC - Here for annual exam.        HPI  Hali Dennis is a 40 y.o. female, , who presents for annual well woman exam. No LMP recorded. Patient has had an IUD.  Periods are absent secondary to birth control.  Dysmenorrhea:moderate.  Patient reports problems with: none.  There were no changes to her medical or surgical history since her last visit.. Partner Status: Marital Status: .  She is sexually active. She has not had new partners since her last STD testing. She does not desire STD testing. .    Additional OB/GYN History   Current contraception: contraceptive methods: IUD.  Insertion date:   Desires to: continue contraception  Last Pap : 2020- negative  Last Completed Pap Smear          Ordered - PAP SMEAR (Every 3 Years) Ordered on 2020  Done - Negative              History of abnormal Pap smear: no  Family history of uterine, colon, breast, or ovarian cancer: yes - maternal and paternal grandmother- breast cancer  Performs monthly Self-Breast Exam: yes  Exercises Regularly:yes  Feelings of Anxiety or Depression: yes - anxiety- on zoloft   Tobacco Usage?: No   OB History        4    Para   3    Term   2       1    AB   1    Living   2       SAB   0    TAB   0    Ectopic   0    Molar        Multiple   0    Live Births   2                Health Maintenance   Topic Date Due   • Annual Gynecologic Pelvic and Breast Exam  Never done   • ANNUAL PHYSICAL  Never done   • COVID-19 Vaccine (1) Never done   • TDAP/TD VACCINES (1 - Tdap) Never done   • HEPATITIS C SCREENING  Never done   • INFLUENZA VACCINE  2021   • PAP SMEAR  2023   • Hepatitis B  Aged Out   • Pneumococcal Vaccine 0-64  Aged Out       The additional following portions of the patient's history were reviewed and updated as appropriate: allergies, current medications, past family history, past medical history, past social history, past surgical history and  "problem list.    Review of Systems   Constitutional: Negative.    Respiratory: Negative.    Cardiovascular: Negative.    Gastrointestinal: Negative.    Endocrine: Negative.    Genitourinary: Negative.    Neurological: Negative.    Psychiatric/Behavioral: Positive for decreased concentration.         I have reviewed and agree with the HPI, ROS, and historical information as entered above. Sravani Logan, APRN    Objective   /68   Ht 160 cm (62.99\")   Wt 64 kg (141 lb)   BMI 24.98 kg/m²     Physical Exam  Vitals and nursing note reviewed. Exam conducted with a chaperone present.   Constitutional:       Appearance: She is well-developed.   HENT:      Head: Normocephalic and atraumatic.   Neck:      Thyroid: No thyroid mass or thyromegaly.   Cardiovascular:      Rate and Rhythm: Normal rate and regular rhythm.      Heart sounds: No murmur heard.     Pulmonary:      Effort: Pulmonary effort is normal. No retractions.      Breath sounds: Normal breath sounds. No wheezing, rhonchi or rales.   Chest:      Chest wall: No mass or tenderness.      Breasts:         Right: Normal. No mass, nipple discharge, skin change or tenderness.         Left: Normal. No mass, nipple discharge, skin change or tenderness.   Abdominal:      Palpations: Abdomen is soft. Abdomen is not rigid. There is no mass.      Tenderness: There is no abdominal tenderness. There is no guarding.      Hernia: No hernia is present. There is no hernia in the left inguinal area.   Genitourinary:     Labia:         Right: No rash, tenderness or lesion.         Left: No rash, tenderness or lesion.       Vagina: Normal. No vaginal discharge or lesions.      Cervix: No cervical motion tenderness, discharge, lesion or cervical bleeding.      Uterus: Normal. Not enlarged, not fixed and not tender.       Adnexa:         Right: No mass or tenderness.          Left: No mass or tenderness.        Rectum: No external hemorrhoid.      Comments: IUD strings " palpable  Musculoskeletal:      Cervical back: Normal range of motion. No muscular tenderness.   Neurological:      Mental Status: She is alert and oriented to person, place, and time.   Psychiatric:         Behavior: Behavior normal.            Assessment and Plan    Problem List Items Addressed This Visit        Genitourinary and Reproductive     IUD (intrauterine device) in place    Overview     IUD strings palpable.  IUD in normal fundal position on ultrasound 11/2020.  She reports no problems           Other Visit Diagnoses     Women's annual routine gynecological examination    -  Primary    Relevant Orders    Pap IG, HPV-hr        She reports mood is better with Zoloft however she is experiencing some decreased concentration at work.  She will discuss this with her PCP who manages her psych medications.      1. GYN annual well woman exam.   2. Reviewed monthly self breast exams.  Instructed to call with lumps, pain, or breast discharge.    3. RTC in 1 year or PRN with problems      Sravani Logan, APRN  06/04/2021

## 2021-06-10 DIAGNOSIS — Z01.419 WOMEN'S ANNUAL ROUTINE GYNECOLOGICAL EXAMINATION: ICD-10-CM

## 2022-03-28 ENCOUNTER — TRANSCRIBE ORDERS (OUTPATIENT)
Dept: ADMINISTRATIVE | Facility: HOSPITAL | Age: 42
End: 2022-03-28

## 2022-03-28 DIAGNOSIS — Z12.31 VISIT FOR SCREENING MAMMOGRAM: Primary | ICD-10-CM

## 2022-04-15 ENCOUNTER — HOSPITAL ENCOUNTER (OUTPATIENT)
Dept: MAMMOGRAPHY | Facility: HOSPITAL | Age: 42
Discharge: HOME | End: 2022-04-15
Admitting: OBSTETRICS & GYNECOLOGY

## 2022-04-15 DIAGNOSIS — Z12.31 VISIT FOR SCREENING MAMMOGRAM: ICD-10-CM

## 2022-04-15 PROCEDURE — 77063 BREAST TOMOSYNTHESIS BI: CPT

## 2022-04-15 PROCEDURE — 77063 BREAST TOMOSYNTHESIS BI: CPT | Performed by: RADIOLOGY

## 2022-04-15 PROCEDURE — 77067 SCR MAMMO BI INCL CAD: CPT

## 2022-04-15 PROCEDURE — 77067 SCR MAMMO BI INCL CAD: CPT | Performed by: RADIOLOGY

## 2022-06-06 ENCOUNTER — OFFICE VISIT (OUTPATIENT)
Dept: OBSTETRICS AND GYNECOLOGY | Facility: CLINIC | Age: 42
End: 2022-06-06

## 2022-06-06 VITALS
BODY MASS INDEX: 25.91 KG/M2 | DIASTOLIC BLOOD PRESSURE: 72 MMHG | WEIGHT: 146.2 LBS | SYSTOLIC BLOOD PRESSURE: 120 MMHG | HEIGHT: 63 IN

## 2022-06-06 DIAGNOSIS — Z01.419 WOMEN'S ANNUAL ROUTINE GYNECOLOGICAL EXAMINATION: Primary | ICD-10-CM

## 2022-06-06 DIAGNOSIS — Z97.5 IUD (INTRAUTERINE DEVICE) IN PLACE: ICD-10-CM

## 2022-06-06 DIAGNOSIS — Z12.31 ENCOUNTER FOR SCREENING MAMMOGRAM FOR MALIGNANT NEOPLASM OF BREAST: ICD-10-CM

## 2022-06-06 PROCEDURE — 99396 PREV VISIT EST AGE 40-64: CPT | Performed by: NURSE PRACTITIONER

## 2022-06-06 RX ORDER — VENLAFAXINE HYDROCHLORIDE 37.5 MG/1
37.5 CAPSULE, EXTENDED RELEASE ORAL DAILY
COMMUNITY
Start: 2022-04-06

## 2022-06-06 NOTE — PROGRESS NOTES
GYN Annual Exam     CC - Here for annual exam.     Subjective   HPI  Hali Dennis is a 41 y.o. female, , who presents for annual well woman exam. No LMP recorded (lmp unknown). Patient has had an implant..  Periods are absent secondary to birth control.  Patient reports problems with: clear discharge clot like, without odor. Patient wants to discuss issues with weight loss.  Partner Status: Marital Status: .  New Partners since last visit: no.  Desires STD Screening: no.    Additional OB/GYN History   Current contraception: contraceptive methods: Mirena  Desires to: continue contraception  Last Pap : Neg HPV: Neg  Last Completed Pap Smear          Ordered - PAP SMEAR (Every 3 Years) Ordered on 6/10/2021    2021  SCANNED - PAP SMEAR    2020  Done - Negative              History of abnormal Pap smear: no  Family history of uterine, colon, breast, or ovarian cancer: yes - PGM and MGM- Breast   Performs monthly Self-Breast Exam: yes  Last mammogram:   Last Completed Mammogram          Ordered - MAMMOGRAM (Every 2 Years) Ordered on 2022    04/15/2022  Mammo Screening Digital Tomosynthesis Bilateral With CAD    2021  Mammo Diagnostic Digital Tomosynthesis Left With CAD    2021  Mammo Screening Digital Tomosynthesis Bilateral With CAD               Exercises Regularly: yes  Feelings of Anxiety or Depression: yes - anxiety   Tobacco Usage?: No   OB History        4    Para   3    Term   2       1    AB   1    Living   2       SAB   0    IAB   0    Ectopic   0    Molar        Multiple   0    Live Births   2                Health Maintenance   Topic Date Due   • ANNUAL PHYSICAL  Never done   • TDAP/TD VACCINES (1 - Tdap) Never done   • HEPATITIS C SCREENING  Never done   • COVID-19 Vaccine (3 - Booster for Pfizer series) 10/06/2021   • Annual Gynecologic Pelvic and Breast Exam  2022   • INFLUENZA VACCINE  2022   • MAMMOGRAM  04/15/2024   • PAP  "SMEAR  06/04/2024   • Hepatitis B  Aged Out   • Pneumococcal Vaccine 0-64  Aged Out       The additional following portions of the patient's history were reviewed and updated as appropriate: allergies, current medications, past family history, past medical history, past social history and past surgical history.    Review of Systems   Constitutional: Negative.    Respiratory: Negative.    Cardiovascular: Negative.    Gastrointestinal: Negative.    Genitourinary: Negative.    Neurological: Negative.    Psychiatric/Behavioral: Negative.        I have reviewed and agree with the HPI, ROS, and historical information as entered above. Sravani Logan, APRN    Objective   /72 (BP Location: Right arm, Patient Position: Sitting, Cuff Size: Adult)   Ht 160 cm (62.99\")   Wt 66.3 kg (146 lb 3.2 oz)   LMP  (LMP Unknown)   Breastfeeding No   BMI 25.90 kg/m²     Physical Exam  Vitals and nursing note reviewed. Exam conducted with a chaperone present.   Constitutional:       Appearance: She is well-developed.   HENT:      Head: Normocephalic and atraumatic.   Neck:      Thyroid: No thyroid mass or thyromegaly.   Cardiovascular:      Rate and Rhythm: Normal rate and regular rhythm.      Heart sounds: No murmur heard.  Pulmonary:      Effort: Pulmonary effort is normal. No retractions.      Breath sounds: Normal breath sounds. No wheezing, rhonchi or rales.   Chest:      Chest wall: No mass or tenderness.   Breasts:      Right: Normal. No mass, nipple discharge, skin change or tenderness.      Left: Normal. No mass, nipple discharge, skin change or tenderness.       Abdominal:      Palpations: Abdomen is soft. Abdomen is not rigid. There is no mass.      Tenderness: There is no abdominal tenderness. There is no guarding.      Hernia: No hernia is present. There is no hernia in the left inguinal area.   Genitourinary:     Labia:         Right: No rash, tenderness or lesion.         Left: No rash, tenderness or lesion.     "   Vagina: Normal. No vaginal discharge or lesions.      Cervix: No cervical motion tenderness, discharge, lesion or cervical bleeding.      Uterus: Normal. Not enlarged, not fixed and not tender.       Adnexa:         Right: No mass or tenderness.          Left: No mass or tenderness.        Rectum: No external hemorrhoid.      Comments: iud strings not visible  Musculoskeletal:      Cervical back: Normal range of motion. No muscular tenderness.   Neurological:      Mental Status: She is alert and oriented to person, place, and time.   Psychiatric:         Behavior: Behavior normal.         Assessment & Plan     Assessment     Problem List Items Addressed This Visit        Genitourinary and Reproductive     IUD (intrauterine device) in place    Overview     IUD strings palpable.  IUD in normal fundal position on ultrasound 11/2020.  She reports no problems             Other Visit Diagnoses     Women's annual routine gynecological examination    -  Primary    Encounter for screening mammogram for malignant neoplasm of breast        Relevant Orders    Mammo Screening Digital Tomosynthesis Bilateral With CAD          1. GYN annual well woman exam.   2.    Issue losing weight. She eats healthy and exercises at [employer] 4-5 days per week for the last 6 months and has only lost 2-3lbs. She was changed from wellbutrin to effexor somewhere around that time (discussed this could be part of it). We briefly discussed macros and tracking. She has a focus appt with a  and also has an appt with a psych NP. Labs including thyroid were normal with PCP.    Plan     1. Reviewed monthly self breast exams.  Instructed to call with lumps, pain, or breast discharge.    2. Ordered Mammogram today  3. RTC in 1 year or PRN with problems.   4. Proceed with scheduled appointments to eval nutrition and medications. Advised to track consistently until her appt on Fri.      Sravani Logan, APRN  06/06/2022

## 2022-12-19 ENCOUNTER — TELEPHONE (OUTPATIENT)
Dept: OBSTETRICS AND GYNECOLOGY | Facility: CLINIC | Age: 42
End: 2022-12-19

## 2022-12-19 ENCOUNTER — OFFICE VISIT (OUTPATIENT)
Dept: OBSTETRICS AND GYNECOLOGY | Facility: CLINIC | Age: 42
End: 2022-12-19

## 2022-12-19 VITALS — DIASTOLIC BLOOD PRESSURE: 74 MMHG | BODY MASS INDEX: 26.39 KG/M2 | SYSTOLIC BLOOD PRESSURE: 118 MMHG | WEIGHT: 149 LBS

## 2022-12-19 DIAGNOSIS — N75.0 BARTHOLIN'S GLAND CYST: Primary | ICD-10-CM

## 2022-12-19 PROCEDURE — 99213 OFFICE O/P EST LOW 20 MIN: CPT | Performed by: NURSE PRACTITIONER

## 2022-12-19 RX ORDER — BUPROPION HYDROCHLORIDE 300 MG/1
TABLET ORAL
COMMUNITY
Start: 2022-12-01

## 2022-12-19 RX ORDER — FLUCONAZOLE 150 MG/1
150 TABLET ORAL DAILY
Qty: 2 TABLET | Refills: 0 | Status: SHIPPED | OUTPATIENT
Start: 2022-12-19

## 2022-12-19 RX ORDER — FLUOXETINE HYDROCHLORIDE 40 MG/1
CAPSULE ORAL
COMMUNITY
Start: 2022-10-30

## 2022-12-19 RX ORDER — SULFAMETHOXAZOLE AND TRIMETHOPRIM 800; 160 MG/1; MG/1
1 TABLET ORAL 2 TIMES DAILY
Qty: 14 TABLET | Refills: 0 | Status: SHIPPED | OUTPATIENT
Start: 2022-12-19 | End: 2022-12-26

## 2022-12-19 NOTE — TELEPHONE ENCOUNTER
Buck alfaro. Spoke with patient. Patient states she has a blueberry sized, tender, red lump on her labia. States she noticed it on Thursday 12/15/22. Denies any fever, drainage, or flu-like symptoms. States she has taken a couple warm baths but has not tried a warm compress. States it is not an area she shaves. Patient is concerned and would like to be seen. Scheduled with Alvarez today at 10:30am.

## 2022-12-19 NOTE — TELEPHONE ENCOUNTER
Pt states she found a grapefruit size cyst on her vaginal area. She said it was painful over the weekend and that is how she discovered it. She would like to be seen ASAP    Please advise

## 2022-12-19 NOTE — PROGRESS NOTES
Chief Complaint   Patient presents with   • Gynecologic Exam         Subjective   HPI  Hali Dennis is a 42 y.o. female, , who presents for evaluation of vulvar lump that is initial.      She reports she first noticed the lump last Thursday. She states it is the size of a large blueberry. She states it was painful over the weekend but has gone away. She states over the weekend it was red but now not so much. She reports it feels deep under the skin.       Her last LMP was No LMP recorded. Patient has had an implant..  Partner Status: Marital Status: .  New Partners since last visit: no.  Desires STD Screening: no. Her STD history is significant for:  none.     Additional OB/GYN History   Last Pap :   Last Completed Pap Smear          PAP SMEAR (Every 3 Years) Next due on 2021  SCANNED - PAP SMEAR    2020  Done - Negative                Tobacco Usage?: No       Current Outpatient Medications:   •  buPROPion XL (WELLBUTRIN XL) 300 MG 24 hr tablet, , Disp: , Rfl:   •  FLUoxetine (PROzac) 40 MG capsule, , Disp: , Rfl:   •  levonorgestrel (Mirena, 52 MG,) 20 MCG/24HR IUD, 1 each by Intrauterine route 1 (One) Time., Disp: , Rfl:   •  fluconazole (Diflucan) 150 MG tablet, Take 1 tablet by mouth Daily. May repeat in 3-4 days if no improvement, Disp: 2 tablet, Rfl: 0  •  methylPREDNISolone (MEDROL) 4 MG dose pack, Take as directed on package instructions., Disp: 21 each, Rfl: 0  •  sulfamethoxazole-trimethoprim (Bactrim DS) 800-160 MG per tablet, Take 1 tablet by mouth 2 (Two) Times a Day for 7 days., Disp: 14 tablet, Rfl: 0  •  venlafaxine XR (EFFEXOR-XR) 37.5 MG 24 hr capsule, Take 37.5 mg by mouth Daily., Disp: , Rfl:      Past Medical History:   Diagnosis Date   •      ELECTIVE   • Anemia    • Encounter for insertion of mirena IUD 10/2018   • Gestational diabetes    • Irregular menses     Q 6-7 WEEKS   • Menorrhagia 2009    PERIODS WERE BETTER WHEN  ON THE PILLS AND SHE WANTS TO START THEM   • Papanicolaou smear 2020    NEGATIVE   • Screening breast examination     SELF;ADMITS   • Wears glasses         Past Surgical History:   Procedure Laterality Date   •  SECTION N/A 2018    Procedure:  SECTION REPEAT * 39 WKS *;  Surgeon: Lauren Jane MD;  Location: [facility] LABOR DELIVERY;  Service: Obstetrics/Gynecology   • INDUCED       EAB - CONFIDENTIAL   • SKIN LESION EXCISION      BENIGN;CHEST;ATYPICAL NEVUS;ONE MODERATE   • SKIN TAG REMOVAL      LABIA;BIOPSY   • TIBIA FRACTURE SURGERY Right     rods in place   • WISDOM TOOTH EXTRACTION         The additional following portions of the patient's history were reviewed and updated as appropriate: allergies and current medications.    Review of Systems   Constitutional: Negative.    Respiratory: Negative.    Cardiovascular: Negative.    Gastrointestinal: Negative.    Genitourinary: Vaginal pain:  cyst.   Psychiatric/Behavioral: Negative.        I have reviewed and agree with the HPI, ROS, and historical information as entered above. Isaac Alvarez, APRN    Objective   /74   Wt 67.6 kg (149 lb)   BMI 26.39 kg/m²     Physical Exam  Vitals and nursing note reviewed. Exam conducted with a chaperone present.   Constitutional:       Appearance: Normal appearance. She is normal weight.   Genitourinary:     Exam position: Lithotomy position.      Labia:         Right: No rash, tenderness or lesion.         Left: Lesion present. No rash or tenderness.       Comments: Chaperone Present. Left bartholin gland cyst approx 1.5 cm round  Neurological:      Mental Status: She is alert.         Assessment & Plan     Assessment     Wet mount performed? No.    Problem List Items Addressed This Visit    None  Visit Diagnoses     Bartholin's gland cyst    -  Primary    Relevant Medications    sulfamethoxazole-trimethoprim (Bactrim DS) 800-160 MG per tablet    fluconazole (Diflucan) 150  MG tablet            Plan     1. Medication(s) Ordered  2. Sitz Bath  3. Return PRN no improvement or worsening of symptoms      Isaac Alvarez, APRN  12/19/2022

## 2023-04-12 ENCOUNTER — OFFICE VISIT (OUTPATIENT)
Dept: OBSTETRICS AND GYNECOLOGY | Facility: CLINIC | Age: 43
End: 2023-04-12
Payer: COMMERCIAL

## 2023-04-12 VITALS — WEIGHT: 155.2 LBS | HEIGHT: 63 IN | BODY MASS INDEX: 27.5 KG/M2

## 2023-04-12 DIAGNOSIS — Z80.41 FAMILY HISTORY OF OVARIAN CANCER: Primary | ICD-10-CM

## 2023-04-12 DIAGNOSIS — Z80.3 FAMILY HISTORY OF BREAST CANCER: ICD-10-CM

## 2023-04-12 NOTE — PROGRESS NOTES
Chief Complaint   Patient presents with   • Gynecologic Exam       Subjective   HPI  Hali Dennis is a 42 y.o. female, . Her last LMP was No LMP recorded. Patient has had an implant.. who presents to discuss genetic testing due to her family history of cancer.    Patient's mother was recently diagnosed with ovarian cancer. Patient's father had lung and liver cancer. Patient's maternal and paternal grandmothers had breast cancer.   Patient would like to discuss genetic testing due to this and if the Mirena IUD is still the best choice of birth control for her as she does not want to increase her risk of cancer more.       She has not had any genetic testing done before. She denies having any abnormal pap smears, wondering if she will need should get a pap smear performed yearly due to her mother being diagnosed.   She reports having a mucus like discharge every couple of weeks, wondering if this is normal ovulation or related to her ovarian cysts. She denies any pain with her cysts.     Additional OB/GYN History     Last Pap :   Last Completed Pap Smear          PAP SMEAR (Every 3 Years) Next due on 2021  SCANNED - PAP SMEAR    2020  Done - Negative                Last mammogram:   Last Completed Mammogram          Scheduled - MAMMOGRAM (Every 2 Years) Scheduled for 2023    04/15/2022  Mammo Screening Digital Tomosynthesis Bilateral With CAD    2021  Mammo Diagnostic Digital Tomosynthesis Left With CAD    2021  Mammo Screening Digital Tomosynthesis Bilateral With CAD                Tobacco Usage?: No   OB History        3    Para   2    Term   1       1    AB   1    Living   2       SAB   0    IAB   0    Ectopic   0    Molar        Multiple   0    Live Births   2                  Current Outpatient Medications:   •  buPROPion XL (WELLBUTRIN XL) 300 MG 24 hr tablet, , Disp: , Rfl:   •  FLUoxetine (PROzac) 40 MG capsule, , Disp: ,  "Rfl:   •  levonorgestrel (Mirena, 52 MG,) 20 MCG/24HR IUD, 1 each by Intrauterine route 1 (One) Time., Disp: , Rfl:      Past Medical History:   Diagnosis Date   •      ELECTIVE   • Anemia    • Anxiety    • Encounter for insertion of mirena IUD 10/2018   • Gestational diabetes    • Irregular menses     Q 6-7 WEEKS   • Menorrhagia 2009    PERIODS WERE BETTER WHEN ON THE PILLS AND SHE WANTS TO START THEM   • Ovarian cyst    • Papanicolaou smear 2020    NEGATIVE   • Placenta previa Aug 2018   • PMS (premenstrual syndrome)    • Screening breast examination     SELF;ADMITS   • Wears glasses         Past Surgical History:   Procedure Laterality Date   •  SECTION N/A 2018    Procedure:  SECTION REPEAT * 39 WKS *;  Surgeon: Lauren Jane MD;  Location: [facility] LABOR DELIVERY;  Service: Obstetrics/Gynecology   • INDUCED       EAB - CONFIDENTIAL   • SKIN LESION EXCISION      BENIGN;CHEST;ATYPICAL NEVUS;ONE MODERATE   • SKIN TAG REMOVAL      LABIA;BIOPSY   • TIBIA FRACTURE SURGERY Right     rods in place   • WISDOM TOOTH EXTRACTION         The additional following portions of the patient's history were reviewed and updated as appropriate: allergies, current medications, past family history, past medical history, past social history, past surgical history and problem list.    Review of Systems   Constitutional: Negative.    Respiratory: Negative.    Cardiovascular: Negative.    Gastrointestinal: Negative.    Genitourinary: Negative.        I have reviewed and agree with the HPI, ROS, and historical information as entered above. Sravani Logan, APRN    Objective   Ht 160 cm (63\")   Wt 70.4 kg (155 lb 3.2 oz)   Breastfeeding No   BMI 27.49 kg/m²     Physical Exam  Constitutional:       Appearance: Normal appearance.   Pulmonary:      Effort: Pulmonary effort is normal.   Neurological:      Mental Status: She is alert.         Assessment & Plan     Assessment "     Problem List Items Addressed This Visit        Family History    Family history of ovarian cancer - Primary    Overview     Patients mother, genetics pending. Refer to genetic counseling. Refer for ovarian cancer screening program.         Relevant Orders    Ambulatory Referral to Genetic Counseling/Testing    Family history of breast cancer    Relevant Orders    Ambulatory Referral to Genetic Counseling/Testing       1. Refer for genetic counseling   2. Once we have genetic testing results will discuss any applicable prophylactic options. Could consider salpingectomy for contraception and theoretical decreased risk of ovarian cancer but she is happy with mirena for bleeding control.     Plan     1. Refer for genetic counseling and ovarian cancer screening. RTO for annual      Srvaani Logan, APRN  04/12/2023

## 2023-04-18 ENCOUNTER — HOSPITAL ENCOUNTER (OUTPATIENT)
Dept: MAMMOGRAPHY | Facility: HOSPITAL | Age: 43
Discharge: HOME | End: 2023-04-18
Admitting: NURSE PRACTITIONER
Payer: COMMERCIAL

## 2023-04-18 DIAGNOSIS — Z12.31 ENCOUNTER FOR SCREENING MAMMOGRAM FOR MALIGNANT NEOPLASM OF BREAST: ICD-10-CM

## 2023-04-18 PROCEDURE — 77063 BREAST TOMOSYNTHESIS BI: CPT

## 2023-04-18 PROCEDURE — 77067 SCR MAMMO BI INCL CAD: CPT

## 2023-05-10 ENCOUNTER — TRANSCRIBE ORDERS (OUTPATIENT)
Dept: ADMINISTRATIVE | Facility: HOSPITAL | Age: 43
End: 2023-05-10
Payer: COMMERCIAL

## 2023-05-10 ENCOUNTER — HOSPITAL ENCOUNTER (OUTPATIENT)
Dept: GENERAL RADIOLOGY | Facility: HOSPITAL | Age: 43
Discharge: HOME | End: 2023-05-10
Admitting: NURSE PRACTITIONER
Payer: COMMERCIAL

## 2023-05-10 DIAGNOSIS — S99.921A RIGHT FOOT INJURY, INITIAL ENCOUNTER: Primary | ICD-10-CM

## 2023-05-10 PROCEDURE — 73630 X-RAY EXAM OF FOOT: CPT

## 2023-07-31 ENCOUNTER — TELEPHONE (OUTPATIENT)
Dept: OBSTETRICS AND GYNECOLOGY | Facility: CLINIC | Age: 43
End: 2023-07-31
Payer: COMMERCIAL

## 2024-05-14 ENCOUNTER — TRANSCRIBE ORDERS (OUTPATIENT)
Dept: ADMINISTRATIVE | Facility: HOSPITAL | Age: 44
End: 2024-05-14
Payer: COMMERCIAL

## 2024-05-14 DIAGNOSIS — Z12.31 SCREENING MAMMOGRAM FOR BREAST CANCER: Primary | ICD-10-CM

## 2024-06-18 ENCOUNTER — HOSPITAL ENCOUNTER (OUTPATIENT)
Dept: MAMMOGRAPHY | Facility: HOSPITAL | Age: 44
Discharge: HOME | End: 2024-06-18
Admitting: OBSTETRICS & GYNECOLOGY
Payer: COMMERCIAL

## 2024-06-18 DIAGNOSIS — Z12.31 SCREENING MAMMOGRAM FOR BREAST CANCER: ICD-10-CM

## 2024-06-18 PROCEDURE — 77067 SCR MAMMO BI INCL CAD: CPT

## 2024-06-18 PROCEDURE — 77063 BREAST TOMOSYNTHESIS BI: CPT

## 2025-06-02 ENCOUNTER — OFFICE VISIT (OUTPATIENT)
Dept: OBSTETRICS AND GYNECOLOGY | Facility: CLINIC | Age: 45
End: 2025-06-02
Payer: COMMERCIAL

## 2025-06-02 VITALS — BODY MASS INDEX: 25.15 KG/M2 | DIASTOLIC BLOOD PRESSURE: 78 MMHG | WEIGHT: 142 LBS | SYSTOLIC BLOOD PRESSURE: 120 MMHG

## 2025-06-02 DIAGNOSIS — Z01.419 PAP TEST, AS PART OF ROUTINE GYNECOLOGICAL EXAMINATION: Primary | ICD-10-CM

## 2025-06-02 DIAGNOSIS — Z12.31 ENCOUNTER FOR SCREENING MAMMOGRAM FOR MALIGNANT NEOPLASM OF BREAST: ICD-10-CM

## 2025-06-02 DIAGNOSIS — Z80.3 FAMILY HISTORY OF BREAST CANCER: ICD-10-CM

## 2025-06-02 RX ORDER — FLUVOXAMINE MALEATE 50 MG
50 TABLET ORAL
COMMUNITY
Start: 2025-04-23 | End: 2025-06-23

## 2025-06-02 RX ORDER — ATOMOXETINE 40 MG/1
40 CAPSULE ORAL DAILY
COMMUNITY
Start: 2025-05-22 | End: 2025-07-21

## 2025-06-02 NOTE — PROGRESS NOTES
Gynecologic Annual Exam Note          GYN Annual Exam     Gynecologic Exam        Subjective     HPI  Hali Dennis is a 44 y.o. female, , who presents for annual well woman exam as a established patient. There were no changes to her medical or surgical history since her last visit.  No LMP recorded. Patient has had an implant.  Her periods are absent secondary to birth control.     Marital Status: . She is sexually active. She has not had new partners. STD testing recommendations have been explained to the patient and she declines STD testing.    The patient would like to discuss the following complaints today: nothing    Additional OB/GYN History   contraceptive methods: IUD.  Insertion date: 6 years ago per patient  Desires to: continue contraception  History of migraines: no    Last Pap : 21. Result: negative. HPV: negative.   Last Completed Pap Smear    This patient has no relevant Health Maintenance data.       History of abnormal Pap smear: no  Family history of uterine, colon, breast, or ovarian cancer: yes - mother with ovarian, both grandmothers with breast  Performs monthly Self-Breast Exam: yes  Last mammogram: 24. Done at . There is a copy in the chart.    Last Completed Mammogram            Awaiting Completion       MAMMOGRAM (Every 2 Years) Order placed this encounter      2025  Order placed for Mammo Screening Digital Tomosynthesis Bilateral With CAD by Isaac Alvarez APRN    2024  Mammo Screening Digital Tomosynthesis Bilateral With CAD    2023  Mammo Screening Digital Tomosynthesis Bilateral With CAD    04/15/2022  Mammo Screening Digital Tomosynthesis Bilateral With CAD    2021  Mammo Diagnostic Digital Tomosynthesis Left With CAD     Only the first 5 history entries have been loaded, but more history exists.                          Colonoscopy: has had a colonoscopy 3 years ago  Exercises Regularly: yes  Feelings of  Anxiety or Depression: yes - on medication  Tobacco Usage?: No       Current Outpatient Medications:     atomoxetine (STRATTERA) 40 MG capsule, Take 1 capsule by mouth Daily., Disp: , Rfl:     fluvoxaMINE (LUVOX) 50 MG tablet, Take 1 tablet by mouth., Disp: , Rfl:     levonorgestrel (Mirena, 52 MG,) 20 MCG/24HR IUD, 1 each by Intrauterine route 1 (One) Time., Disp: , Rfl:      Patient denies the need for medication refills today.    OB History          4    Para   3    Term   2       1    AB   1    Living   2         SAB   0    IAB   0    Ectopic   0    Molar        Multiple   0    Live Births   2                Past Medical History:   Diagnosis Date         ELECTIVE    Anemia     Anxiety     Encounter for insertion of Mirena IUD 10/2018    Gestational diabetes     Irregular menses     Q 6-7 WEEKS    Menorrhagia 2009    PERIODS WERE BETTER WHEN ON THE PILLS AND SHE WANTS TO START THEM    Ovarian cyst     Papanicolaou smear 2020    NEGATIVE    Placenta previa Aug 2018    PMS (premenstrual syndrome)     Screening breast examination     SELF;ADMITS    Urinary tract infection     Wears glasses         Past Surgical History:   Procedure Laterality Date     SECTION N/A 2018    Procedure:  SECTION REPEAT * 39 WKS *;  Surgeon: Lauren Jane MD;  Location: [facility] LABOR DELIVERY;  Service: Obstetrics/Gynecology    INDUCED       EAB - CONFIDENTIAL    SKIN LESION EXCISION      BENIGN;CHEST;ATYPICAL NEVUS;ONE MODERATE    SKIN TAG REMOVAL      LABIA;BIOPSY    TIBIA FRACTURE SURGERY Right 2000    rods in place    WISDOM TOOTH EXTRACTION         Health Maintenance   Topic Date Due    TDAP/TD VACCINES (1 - Tdap) Never done    HEPATITIS C SCREENING  Never done    ANNUAL PHYSICAL  Never done    Annual Gynecologic Pelvic and Breast Exam  2023    PAP SMEAR  2024    COVID-19 Vaccine (3 - 2024-25 season) 2024    INFLUENZA VACCINE  2025    MAMMOGRAM   06/18/2026    Pneumococcal Vaccine 0-49  Aged Out       The additional following portions of the patient's history were reviewed and updated as appropriate: allergies, current medications, past family history, past medical history, past social history, and past surgical history.    Review of Systems   Constitutional: Negative.    HENT: Negative.     Eyes: Negative.    Respiratory: Negative.     Cardiovascular: Negative.    Gastrointestinal: Negative.    Endocrine: Negative.    Genitourinary: Negative.    Musculoskeletal: Negative.    Skin: Negative.    Allergic/Immunologic: Negative.    Neurological: Negative.    Hematological: Negative.    Psychiatric/Behavioral: Negative.       I have reviewed and agree with the HPI, ROS, and historical information as entered above. Vandanetojai Alvarez, APRN     Objective   /78   Wt 64.4 kg (142 lb)   BMI 25.15 kg/m²     Physical Exam  Vitals and nursing note reviewed. Exam conducted with a chaperone present.   Constitutional:       Appearance: Normal appearance. She is well-developed.   HENT:      Head: Normocephalic and atraumatic.   Neck:      Thyroid: No thyroid mass or thyromegaly.   Cardiovascular:      Rate and Rhythm: Normal rate.      Heart sounds: No murmur heard.  Pulmonary:      Effort: Pulmonary effort is normal. No retractions.      Breath sounds: No wheezing, rhonchi or rales.   Chest:      Chest wall: No mass or tenderness.   Breasts:     Right: Normal. No mass, nipple discharge, skin change or tenderness.      Left: Normal. No mass, nipple discharge, skin change or tenderness.   Abdominal:      Palpations: Abdomen is soft. Abdomen is not rigid. There is no mass.      Tenderness: There is no abdominal tenderness. There is no guarding.      Hernia: No hernia is present.   Genitourinary:     General: Normal vulva.      Exam position: Lithotomy position.      Labia:         Right: No rash, tenderness or lesion.         Left: No rash, tenderness or lesion.        Vagina: Normal. No vaginal discharge or lesions.      Cervix: No cervical motion tenderness, discharge, lesion or cervical bleeding.      Uterus: Normal. Not enlarged, not fixed and not tender.       Adnexa: Right adnexa normal and left adnexa normal.        Right: No mass or tenderness.          Left: No mass or tenderness.        Rectum: Normal. No external hemorrhoid.      Comments: Unable to see IUD strings  Musculoskeletal:      Cervical back: Normal range of motion. No muscular tenderness.   Neurological:      Mental Status: She is alert and oriented to person, place, and time.   Psychiatric:         Behavior: Behavior normal.            Assessment and Plan    Problem List Items Addressed This Visit       Family history of breast cancer    Relevant Orders    Ambulatory Referral to Genetic Counseling/Testing     Other Visit Diagnoses         Pap test, as part of routine gynecological examination    -  Primary    Relevant Orders    LIQUID-BASED PAP SMEAR WITH HPV GENOTYPING REGARDLESS OF INTERPRETATION ([facility])      Encounter for screening mammogram for malignant neoplasm of breast        Relevant Orders    Mammo Screening Digital Tomosynthesis Bilateral With CAD                GYN annual well woman exam.   Pap guidelines reviewed.  Reviewed risks/benefits of hormonal contraception after age 35, including possible increased risk of breast cancer, heart disease, blood clots and strokes.  Patient strongly desires to stay on hormonal contraception.  Reviewed monthly self breast exams.  Instructed to call with lumps, pain, or breast discharge.    Ordered Mammogram today  Reviewed exercise as a preventative health measures.   Reccommended Flu Vaccine in Fall of each year.  RTC in 1 year or PRN with problems.  Unable to see IUD strings on exam. Encouraged US to confirm placement, although low likelihood of expulsion as she has no cycles/bleeding. Her strings have not been visible on exam for many years and  she does not feel she needs US at this time. She will call and schedule if she changes her mind.  Referral to genetic testing for familial hx of breast cancer.    Isaac Alvarez, APRN  06/02/2025

## 2025-06-03 LAB — REF LAB TEST METHOD: NORMAL

## 2025-06-20 ENCOUNTER — RESULTS FOLLOW-UP (OUTPATIENT)
Facility: HOSPITAL | Age: 45
End: 2025-06-20
Payer: COMMERCIAL

## 2025-06-20 ENCOUNTER — HOSPITAL ENCOUNTER (OUTPATIENT)
Dept: MAMMOGRAPHY | Facility: HOSPITAL | Age: 45
Discharge: HOME | End: 2025-06-20
Admitting: NURSE PRACTITIONER
Payer: COMMERCIAL

## 2025-06-20 DIAGNOSIS — Z12.31 ENCOUNTER FOR SCREENING MAMMOGRAM FOR MALIGNANT NEOPLASM OF BREAST: ICD-10-CM

## 2025-06-20 LAB
NCCN CRITERIA FLAG: ABNORMAL
TYRER CUZICK SCORE: 22.4

## 2025-06-20 PROCEDURE — 77067 SCR MAMMO BI INCL CAD: CPT

## 2025-06-20 PROCEDURE — 77063 BREAST TOMOSYNTHESIS BI: CPT

## 2025-06-20 NOTE — PROGRESS NOTES
This patient recently completed the CARE risk assessment for a mammogram appointment. Based on the responses, the patient meets NCCN criteria for genetic testing and the Tyrer-Cuzick breast cancer risk score is > 20. At the time of the assessment, the patient was provided with both written and video educational materials regarding genetic testing.     Navigator follow-up:    The patient has a genetic counseling appointment scheduled for 7/1/2025 at [facility]. The genetic counselor will discuss testing options and the Tyrer-Cuzick score with the patient at that time. No orders or referrals are indicated through the CARE program at this time.

## 2025-06-26 ENCOUNTER — TELEPHONE (OUTPATIENT)
Dept: GENETICS | Facility: HOSPITAL | Age: 45
End: 2025-06-26
Payer: COMMERCIAL

## 2025-06-26 NOTE — TELEPHONE ENCOUNTER
Called pt to remind them of their upcoming genetic counseling appt and to review family history prior to appt. Was unable to leave a voice mail.

## 2025-07-01 ENCOUNTER — LAB (OUTPATIENT)
Dept: LAB | Facility: HOSPITAL | Age: 45
End: 2025-07-01
Payer: COMMERCIAL

## 2025-07-01 ENCOUNTER — CLINICAL SUPPORT (OUTPATIENT)
Dept: GENETICS | Facility: HOSPITAL | Age: 45
End: 2025-07-01
Payer: COMMERCIAL

## 2025-07-01 DIAGNOSIS — Z80.41 FAMILY HISTORY OF MALIGNANT NEOPLASM OF OVARY: ICD-10-CM

## 2025-07-01 DIAGNOSIS — Z13.79 GENETIC TESTING: Primary | ICD-10-CM

## 2025-07-01 DIAGNOSIS — Z80.3 FAMILY HISTORY OF MALIGNANT NEOPLASM OF BREAST: ICD-10-CM

## 2025-07-01 PROCEDURE — 96041 GENETIC COUNSELING SVC EA 30: CPT

## 2025-07-01 PROCEDURE — 36415 COLL VENOUS BLD VENIPUNCTURE: CPT

## 2025-07-01 NOTE — PROGRESS NOTES
Date of Visit: 2025   Patient Name: Hali Dennis  : 1980   MRN: [id number]     Referring Provider: Isaac Alvarez APRN     REASON FOR CONSULT  Hali Dennis is a 44 y.o. female referred for genetic counseling due to a family history of breast and ovarian cancer.  Ms. Dennis does not have a personal history of cancer.  She reached menarche at age 13 and had her first child at age 35.  She is premenopausal and has never used HRT.  She retains her ovaries and uterus.  Her breasts are heterogeneously dense.  She is currently being followed by the [facility]'s ovarian cancer screening program.  Ms. Dennis elected to pursue genetic testing via swabr CancerNext panel with [employer] analyzing 40-cancer risk genes.    PERTINENT FAMILY HISTORY:  A cancer-focused four-generation pedigree was obtained via patient reporting    Mother - ovarian cancer, 70  Maternal Grandmother - right breast cancer, 50's    - left breast cancer, 60's  Father - lung cancer, 47 (smoked)  Paternal Grandmother - breast and liver cancer, 50's    RISK ASSESSMENT  Ms. Dennis's reported family history is suggestive of a hereditary cancer risk.  Ms. Dennis meets NCCN guidelines criteria for genetic testing based on her mother's ovarian cancer diagnosis and her maternal grandmother's contralateral multiple primary breast cancer diagnoses.  A significant proportion (>50%) of hereditary breast and ovarian cancer can be attributed to mutations in the BRCA1 and BRCA2 genes.  Females with mutations in BRCA1/2  can have a lifetime breast cancer risk up to 60 - 84%, while the general population risk for breast cancer is 12 - 13%.  BRCA1 mutations can also increase the lifetime risk for ovarian cancer up to 58% and BRCA2 mutations can increase this risk up to 29%.  The general population lifetime ovarian cancer risk is 1 - 2%.  BRCA1/2 mutations can also significantly increase the lifetime  risk of certain cancers in males such as prostate cancer.  Mutations in these two genes can also increase the risk for pancreatic cancer and male breast cancer.  There are other clinically significant cancer related genes, as well as other, more rare, hereditary cancer syndromes than can increase risk for breast and ovarian cancers. The degree of risk and screening recommendations vary by gene, the individual's age, and related family history.    GENETIC COUNSELING  We reviewed the family history information in detail. Cases of cancer follow three general patterns: sporadic, familial, and hereditary.  While most cancer cases are sporadic (~70% of cancer cases), some cases appear to occur in family clusters.  These cases are said to be familial and account for around 20% of cancer cases.  Familial cases may be due to a combination of shared genes and environmental factors among family members that we cannot evaluate via genetic testing at this time.  In 5% - 10% of cancer cases, the risk for cancer is inherited, and the genes responsible for the increased cancer risk are known.      Family histories typical of hereditary cancer syndromes usually include multiple first- and second-degree relatives diagnosed with cancer types that define a syndrome.  These cases tend to be diagnosed at younger-than-expected ages and can be bilateral or multifocal.  The cancer in these families follows an autosomal dominant inheritance pattern, which indicates the likely presence of a mutation in a cancer gene.  Children and siblings of an individual carrying a mutation have a 50% chance of inheriting that mutation, thereby inheriting the increased risk to develop cancer.  However, it is not recommended to pursue genetic testing for adult-onset cancers in children as the results would not have clinical utility until some time in adulthood among other ethical reasons.  These mutations can be passed down from the maternal or the paternal  "lineage.    We discussed that risk factors or \"red flags\" for hereditary risk include cancers diagnosed at earlier-than-average ages, multiple family members diagnosed with cancers associated with mutations in the same gene, or multiple generations of associated cancers. Dependent on the specific cancer type or syndrome, there exists the potential for several clinically significant genes related to the cancer's onset or increased risk for development.  Therefore, the standard approach to hereditary cancer genetic testing at this time is via multi-gene panel analyzing several genes associated with a hereditary risk for cancer.  Once results are completed, we will review them in the context of the patient's personal and family history to determine what, if any, post-test cancer risk management changes are recommended.  When affected relatives are unavailable or unwilling to pursue genetic testing, it is reasonable to offer genetic testing to an unaffected individual.      GENETIC TESTING  The risks, benefits, and limitations of genetic testing and implications for clinical management following testing were reviewed.  Genetic test results can influence decisions regarding screening and prevention.  Genetic testing can have significant psychological implications for both individuals and families. Also discussed was the possibility of insurance discrimination based on genetic test results and the laws in place to prevent this, as well as the limitations of these laws.      The Genetic Information Nondiscrimination Act (RAMONA) is a federal law enacted in May 2008.  RAMONA prohibits discrimination on the basis of genetic information such as genetic test results with respect to health insurance and employment status.  Under Title 1 of RAMONA, health insurance companies are prohibited from using an individual's genetic information to change premiums, drop or change an individual's coverage, or prevent coverage from being " "acquired.  Title 2 of RAMONA prohibits employers from using genetic information in employment decisions such as, but not limited to, hiring, firing, promotions, pay, and job assignments.  RAMONA protections do not protect against genetic discrimination by life insurance, long-term care or disability insurance,  service members, federal employees, those using the  Health Service, or those employed by a small business with fewer than 15 employees.    We discussed panel testing, which could involve testing numerous genes associated with increased cancer risk.  The implications of a positive,negative, or VUS test result were discussed.  We also discussed the importance of testing an affected relative. In general, a negative genetic test result is most informative if a mutation has first been established in an affected member of the family.  In cases where an affected individual is not available or interested in testing, it is appropriate to offer testing to an unaffected individual.     With multigene panel testing, it is not uncommon for a variant of uncertain significance (VUS) to be identified.  Variants are termed \"VUS\" when there is not sufficient evidence to classify the variant as a negative (not harmful) variant or a positive (harmful) mutation. Genetic testing labs work to reclassify all VUSs overtime and will issue an updated report when a reclassification occurs.  The majority (over 90%) of VUSs that are reclassified are found to be negative genetic variants, however a small percentage will be upgraded to a harmful mutation. Clinically, a VUS is treated as a negative result.  If genetic testing is negative or a variant of uncertain significance (VUS) is found, management should be guided by a family history-based risk assessment.  Medical care should not be altered based on a VUS result.  Genetic testing for other unaffected (never had cancer) relatives is not recommended for a VUS.    We discussed " that there are limitations to testing an individual who has not had cancer.  A negative test result does not mean Ms. Williamsons risk for cancer is low or even normal, although the risk would not be as high as it would with positive (harmful mutation) result.  It could still be increased over the general population based on family history alone.  If Ms. Dennis tests negative it could be for several different reasons such as:    The possibility that there is a pathogenic variant causing cancer in the family, and that gene was on the patient's test, but Ms. Dennis did not inherit it. We cannot know if this is the case by only testing the patient.  A familial mutation could therefore still be present in the family and other close family members are still at risk.  Ms. Dennis could have a pathogenic variant in one of the genes tested for that was not detected. Current testing will identify the overwhelming majority of pathogenic variants, but some are not detectable with current technology.   Ms. Dennis may carry a pathogenic variant in another gene associated with hereditary cancer predisposition that was not tested for, or the risk in the family may be due to other genetic factors that are not well understood.    ASSESSMENT AND PLAN  Ms. Dennis meets NCCN guidelines for genetic testing.  We reviewed the options for genetic testing including a multi-gene panel of high risk genes, a panel of genes with known management guidelines, or a broader approach including more newly discovered genes. We reviewed the benefits and drawbacks of this approach, including finding mutations in genes that are less well understood, or results that are unexpected based on this family history.  Ms. Dennis elected to pursue genetic testing.  [employer] [facility] [employer]'s CancerNext panel was ordered, which analyzes 40 genes associated with an increased cancer risk. The genes on this panel include APC, QUE, AXIN2, BAP1, BARD1, BMPR1A, BRCA1,  BRCA2, BRIP1, CDH1, CDKN2A, CHEK2, EPCAM, FH, FLCN, GREM1, HOXB13, MBD4, MET, MLH1, MSH2, MSH3, MSH6, MUTYH, NF1, NTHL1, PALB2, PMS2, POLD1, POLE, PTEN, RAD51C, RAD51D, RPS20, SMAD4, STK11, TP53, TSC1, TSC2, and VHL.  A blood sample for genetic evaluation was collected on 7/1/2025.   Genetic testing results are expected in 2 - 4 weeks from 7/1/2025.  We will contact the patient when results are received.  Ms. Dennis asked excellent questions during the consult and did not demonstrate any acute psychosocial distress during our visit. This clinic encounter was 30 minutes.      Grover Hargrove MS, [facility]  Genetic Counselor  [facility]

## 2025-07-15 ENCOUNTER — TELEPHONE (OUTPATIENT)
Dept: GENETICS | Facility: HOSPITAL | Age: 45
End: 2025-07-15
Payer: COMMERCIAL

## 2025-07-18 ENCOUNTER — TELEPHONE (OUTPATIENT)
Dept: GENETICS | Facility: HOSPITAL | Age: 45
End: 2025-07-18
Payer: COMMERCIAL

## 2025-07-25 ENCOUNTER — DOCUMENTATION (OUTPATIENT)
Dept: GENETICS | Facility: HOSPITAL | Age: 45
End: 2025-07-25
Payer: COMMERCIAL

## 2025-07-25 NOTE — PROGRESS NOTES
Date of Visit: 2025  Name: Hali Dennis  : 1980  MRN: [id number]    Referring Provider: Isaac Alvarez APRN      REASON FOR CONSULT  Hali Dennis is a 44 y.o. female referred for genetic counseling due to a family history of breast and ovarian cancer.  Ms. Dennis does not have a personal history of cancer.  She reached menarche at age 13 and had her first child at age 35.  She is premenopausal and has never used HRT.  She retains her ovaries and uterus.  Her breasts are heterogeneously dense.  She is currently being followed by the [facility]'s ovarian cancer screening program.  Ms. Dennis elected to pursue genetic testing via [facility] panel with [facility] analyzing 40-cancer risk genes.  Ms. Dennis's genetic test result was NEGATIVE for pathogenic mutations in all 40 - cancer risk genes analyzed.  However, due to her family history of breast cancer and her own personal risk factors, her estimated lifetime breast cancer risk is 22.4% as calculated by the Tyrer-Cuzick model.  She is considered at an increased lifetime risk for breast cancer and additional and more frequent breast surveillance is recommended.  Ms. Dennis was notified of these results via telephone on 2025.  A copy of her family history of cancer, Tyrer-Cuzick report, and genetic test are attached to this note.     PERTINENT FAMILY HISTORY:  A cancer-focused four-generation pedigree was obtained via patient reporting     Mother - ovarian cancer, 70  Maternal Grandmother - right breast cancer, 50's                          - left breast cancer, 60's  Father - lung cancer, 47 (smoked)  Paternal Grandmother - breast and liver cancer, 50's    GENETIC TESTING RESULTS AND RECOMMENDATIONS  Genetic testing was performed by [facility] analyzing 40-cancer-risk genes.    Genetic testing was negative for pathogenic mutations by sequencing, rearrangement testing,  and RNA analysis for the 40 genes on the CancerNext panel.  This result does not clarify the cause of Ms. Dennis's family history of cancer, nor does it absolutely rule out a hereditary cause either.  There could be a mutation in the family that explains Ms. Williamsons family history of cancer that she did not inherit.  There could be a mutation in the family that explains the pattern of cancer that we cannot identify at this time or in a gene that was not tested.  There could be a mutation in one of the genes tested that was not detected. Current genetic testing technologies will identify the majority of mutations with high accuracy, but some remain undetectable at this time.  Ms. Rojo family history of cancer could be occurring not due to a single gene mutation, but due to multiple undetectable genetic and environmental factors.  We encourage patients to reach out over time to inquire about any new methodologies of testing or newly identified cancer risk genes that could explain their personal or family history of cancer.    The Tyrer-Cuzick model (LACY) is a validated female breast cancer risk tool that assesses an unaffected (has not had breast cancer) female's lifetime breast cancer risk.  Tyrer-Cuzick version 8.0b estimates Ms. Williamsons lifetime breast cancer risk is 22.4%.  This model predicts the average 43 y.o. unaffected female's lifetime risk of breast cancer to be 10.5%.The National Comprehensive Cancer Network guidelines (version 2.2025) recommend the following for 43 y.o. females who have a greater than 20% lifetime risk of breast cancer calculated from validated models such as the Tyrer-Cuzick model:     Clinical breast exam every 6-12 months and consider a referral to a breast specialist as appropriate  Annual screening mammogram with tomosynthesis   Annual breast MRI with and without contrast  Consider contrast-enhanced mammography (JESUS) or molecular breast imaging (MBI) for those that  qualify for, but cannot undergo MRI  Whole breast ultrasound may be done if contrast-enhanced imaging or functional imaging is not available/accessible  Consider risk reduction strategies such as oral chemoprevention (Tamoxifen, Raloxifene, etc.)  The appropriateness, pros, and cons of any intervention should first be discussed with the appropriate provider before the intervention's implementation  Practice breast awareness     GENETIC COUNSELING  We reviewed the family history and her personal history information in detail. Cases of cancer follow three general patterns: sporadic, familial, and hereditary.  While most cancer cases are sporadic (~70% of cancer cases), some cases appear to occur in family clusters.  These cases are said to be familial and account for around 20% of cancer cases.  Familial cases may be due to a combination of shared genes and environmental factors among family members that we cannot evaluate via genetic testing at this time.  In 5% - 10% of cancer cases, the risk for cancer is inherited, and the genes responsible for the increased cancer risk are known.       Family histories typical of hereditary cancer syndromes usually include multiple first- and second-degree relatives diagnosed with cancer types that define a syndrome.  These cases tend to be diagnosed at younger-than-expected ages and can be bilateral or multifocal.  The cancer in these families follows an autosomal dominant inheritance pattern, which indicates the likely presence of a mutation in a cancer gene.  Children and siblings of an individual carrying a mutation have a 50% chance of inheriting that mutation, thereby inheriting the increased risk to develop cancer.  However, it is not recommended to pursue genetic testing for adult-onset cancers in children as the results would not have clinical utility until some time in adulthood among other ethical reasons.  These mutations can be passed down from the maternal or the  "paternal lineage.     We discussed that risk factors or \"red flags\" for hereditary risk include cancers diagnosed at earlier-than-average ages, multiple family members diagnosed with cancers associated with mutations in the same gene, or multiple generations of associated cancers. Dependent on the specific cancer type or syndrome, there exists the potential for several clinically significant genes related to the cancer's onset or increased risk for development.  Therefore, the standard approach to hereditary cancer genetic testing at this time is via multi-gene panel analyzing several genes associated with a hereditary risk for cancer.  We reviewed the results in the context of the patient's personal and family history to determine what, if any, post-test cancer risk management changes are recommended.     GENETIC TESTING  The risks, benefits, and limitations of genetic testing and implications for clinical management following testing were reviewed.  We discussed the implications and limitations of a positive, negative, or variant of uncertain significance (VUS) test result.  Genetic test results can influence decisions regarding screening and prevention.  Genetic testing can have significant psychological implications for both individuals and families. Also discussed was the possibility of insurance discrimination based on genetic test results and the laws in place to prevent this, as well as the limitations of these laws.       The Genetic Information Nondiscrimination Act (RAMONA) is a federal law enacted in May 2008.  RAMONA prohibits discrimination on the basis of genetic information such as genetic test results with respect to health insurance and employment status.  Under Title 1 of RMAONA, health insurance companies are prohibited from using an individual's genetic information to change premiums, drop or change an individual's coverage, or prevent coverage from being acquired.  Title 2 of RAMONA prohibits employers " from using genetic information in employment decisions such as, but not limited to, hiring, firing, promotions, pay, and job assignments.  RAMONA protections do not protect against genetic discrimination by life insurance, long-term care or disability insurance,  service members, federal employees, those using the [employer], or those employed by a small business with fewer than 15 employees.     FAMILY CONSIDERATIONS  Genetic testing for Ms. Dennis's relatives may be informative despite Ms. Dennis's negative test result.  Her family history of ovarian and breast cancer is suggestive of a hereditary cancer-causing mutation possibly existing in the family that she did not inherit.  Testing unaffected family members may result in negative results as well or VUS's that would not clarify her family history of cancer.  Ms. Dennis cannot pass on mutations that she does not have.  Family members are encouraged to discuss their family history of cancer and appropriate cancer screening recommendations with their healthcare providers.  Family members are also encouraged to inquire about information regarding genetics and genetic testing, if appropriate, at a clinic that offers genetic counseling or their healthcare provider.  We would be happy to see relatives in our clinic for genetic counseling and testing.  They can request a referral from their healthcare provider to [facility] and that will prompt a coordinator to call them for an appointment.   For family members who live elsewhere, there are genetic counselors at most major medical centers.  They can find a genetic counselor by visiting the National Society of Genetic Counselors website at www.nsgc.org or they can call our office and we would be happy to give them the contact information of the closest genetic counselor.      SUMMARY  Ms. Dennis's hereditary cancer genetic test identified no pathogenic mutations  increasing her personal cancer risk, nor explaining her family history of cancer.  Her estimated lifetime breast cancer risk is 22.4%.  She is considered at an increased lifetime risk for breast cancer and additional breast surveillance is recommended.  A referral will be placed to the [facility] at [facility] to manage her breast screenings.  It is recommended that Ms. Dennis follow her provider's recommendations for future cancer screening and future risk reduction.        Grover Hargrove MS, [facility]  Genetic Counselor  [facility]    Cc:  Hali Dennis   Isaac Alvarez APRN

## (undated) DEVICE — COATED VICRYL  (POLYGLACTIN 910) SUTURE, VIOLET BRAIDED, STERILE, SYNTHETIC ABSORBABLE SUTURE: Brand: COATED VICRYL

## (undated) DEVICE — SUT PLAIN  3/0 CT1 27IN 842H

## (undated) DEVICE — SUT VIC 2/0 CT1 27IN J339H BX/36

## (undated) DEVICE — MAT PREVALON MOBL TRANSFR AIR WO/PAD 39X80IN

## (undated) DEVICE — PROXIMATE RH ROTATING HEAD SKIN STAPLERS (35 WIDE) CONTAINS 35 STAINLESS STEEL STAPLES: Brand: PROXIMATE

## (undated) DEVICE — SUT GUT CHRM 1 CTX 36IN 905H

## (undated) DEVICE — 3M™ STERI-STRIP™ REINFORCED ADHESIVE SKIN CLOSURES, R1547, 1/2 IN X 4 IN (12 MM X 100 MM), 6 STRIPS/ENVELOPE: Brand: 3M™ STERI-STRIP™

## (undated) DEVICE — SOL IRR NACL 0.9PCT BT 1000ML

## (undated) DEVICE — SUT VIC 3/0 PS2 27IN J427H

## (undated) DEVICE — TRY SPINE BLCK WHITACRE 25G 3X5IN

## (undated) DEVICE — GLV SURG BIOGEL LTX PF 7 1/2

## (undated) DEVICE — SOL IRR H2O BTL 1000ML STRL

## (undated) DEVICE — ADHS LIQ MASTISOL 2/3ML

## (undated) DEVICE — PK C/SECT 10